# Patient Record
Sex: FEMALE | Race: WHITE | NOT HISPANIC OR LATINO | Employment: FULL TIME | URBAN - METROPOLITAN AREA
[De-identification: names, ages, dates, MRNs, and addresses within clinical notes are randomized per-mention and may not be internally consistent; named-entity substitution may affect disease eponyms.]

---

## 2019-09-10 ENCOUNTER — TELEPHONE (OUTPATIENT)
Dept: FAMILY MEDICINE | Facility: CLINIC | Age: 64
End: 2019-09-10

## 2019-09-11 NOTE — TELEPHONE ENCOUNTER
----- Message from Pao Davis sent at 9/10/2019  9:44 AM CDT -----  IMMUNIZATION, LOGAN ARCE, 9/7/19

## 2019-11-18 ENCOUNTER — OFFICE VISIT (OUTPATIENT)
Dept: FAMILY MEDICINE | Facility: CLINIC | Age: 64
End: 2019-11-18
Payer: COMMERCIAL

## 2019-11-18 DIAGNOSIS — K76.0 NAFLD (NONALCOHOLIC FATTY LIVER DISEASE): ICD-10-CM

## 2019-11-18 DIAGNOSIS — Z13.89 SCREENING FOR BLOOD OR PROTEIN IN URINE: ICD-10-CM

## 2019-11-18 DIAGNOSIS — Z00.00 ANNUAL PHYSICAL EXAM: Primary | ICD-10-CM

## 2019-11-18 DIAGNOSIS — Z13.6 SCREENING FOR ISCHEMIC HEART DISEASE (IHD): ICD-10-CM

## 2019-11-18 DIAGNOSIS — Z13.29 SCREENING FOR ENDOCRINE DISORDER: ICD-10-CM

## 2019-11-18 DIAGNOSIS — R74.8 ELEVATED LIVER ENZYMES: ICD-10-CM

## 2019-11-18 DIAGNOSIS — Z12.31 SCREENING MAMMOGRAM, ENCOUNTER FOR: ICD-10-CM

## 2019-11-18 PROCEDURE — 99396 PREV VISIT EST AGE 40-64: CPT | Mod: S$GLB,,, | Performed by: FAMILY MEDICINE

## 2019-11-18 PROCEDURE — 99396 PR PREVENTIVE VISIT,EST,40-64: ICD-10-PCS | Mod: S$GLB,,, | Performed by: FAMILY MEDICINE

## 2019-11-18 NOTE — PROGRESS NOTES
SUBJECTIVE:    Patient ID: Naye Gallegos is a 63 y.o. female.    Chief Complaint: Arthritis (check up)    Pt here for annual exam.    Has a hx macular degeneration. Doing good.  Seeing Dr. Watts.  Had a good cataract surgery.  No longer wearing contacts and reading glasses. Taking AREDS 2.    Walks for exercise, but works long hours.    No longer taking any extra vitamins and teas.  AREDS 2 has vitamin E.  Has a hx of liver enzymes.      Mammogram is due. Pap by Dr. ELEN Lopez      No visits with results within 6 Month(s) from this visit.   Latest known visit with results is:   No results found for any previous visit.       Past Medical History:   Diagnosis Date    Arthritis     Macular degeneration      Past Surgical History:   Procedure Laterality Date    bilateral cataract surgery      BREAST BIOPSY       SECTION       Family History   Problem Relation Age of Onset    Cancer Mother        Marital Status:   Alcohol History:  reports that she drinks about 2.0 - 3.0 standard drinks of alcohol per week.  Tobacco History:  reports that she has never smoked. She has never used smokeless tobacco.  Drug History:  reports that she does not use drugs.    Review of patient's allergies indicates:   Allergen Reactions    Cheese      Other reaction(s): Headache       Current Outpatient Medications:     vit C/E/Zn/coppr/lutein/zeaxan (OCUVITE LUTEIN AND ZEAXANTHIN ORAL), Take 2 capsules by mouth once daily., Disp: , Rfl:     Review of Systems   Constitutional: Negative for appetite change, fatigue, fever and unexpected weight change.   Respiratory: Negative for cough, chest tightness, shortness of breath and wheezing.    Cardiovascular: Negative for chest pain and leg swelling.   Gastrointestinal: Negative for abdominal pain, constipation, nausea and vomiting.        -heartburn   Genitourinary: Negative for difficulty urinating, dysuria, frequency and urgency.   Musculoskeletal: Negative for  "arthralgias, back pain, myalgias and neck pain.   Skin: Negative for rash.   Neurological: Negative for dizziness, weakness, numbness and headaches.   Hematological: Does not bruise/bleed easily.   Psychiatric/Behavioral: Negative for dysphoric mood, sleep disturbance and suicidal ideas. The patient is not nervous/anxious.    All other systems reviewed and are negative.         Objective:      Vitals:    11/18/19 1544   BP: (P) 138/88   Pulse: (P) 103   SpO2: (P) 97%   Weight: (P) 93.4 kg (205 lb 12.8 oz)   Height: (P) 5' 9" (1.753 m)     Body mass index is 30.39 kg/m² (pended).     Physical Exam   Constitutional: She is oriented to person, place, and time. She appears well-developed and well-nourished. No distress.   overweight   HENT:   Head: Normocephalic and atraumatic.   Neck: Neck supple. No thyromegaly present.   Cardiovascular: Normal rate, regular rhythm and normal heart sounds. Exam reveals no friction rub.   No murmur heard.  Pulmonary/Chest: Effort normal and breath sounds normal. She has no wheezes. She has no rales.   Abdominal: Soft. Bowel sounds are normal. She exhibits no distension. There is no tenderness.   Musculoskeletal: She exhibits no edema.   Lymphadenopathy:     She has no cervical adenopathy.   Neurological: She is alert and oriented to person, place, and time.   Skin: Skin is warm and dry. No rash noted.   Psychiatric: She has a normal mood and affect. Her speech is normal and behavior is normal. Judgment and thought content normal. She is attentive.   Vitals reviewed.        Assessment:       1. Annual physical exam    2. Elevated liver enzymes    3. NAFLD (nonalcoholic fatty liver disease)    4. Screening mammogram, encounter for    5. Screening for blood or protein in urine    6. Screening for ischemic heart disease (IHD)    7. Screening for endocrine disorder         Plan:       Annual physical exam  Comments:  To have pap, eye exam, and mammogram soon  Orders:  -     Lipid panel; " Future; Expected date: 11/18/2019  -     Comprehensive metabolic panel; Future; Expected date: 11/18/2019  -     Urinalysis; Future; Expected date: 11/18/2019  -     TSH w/reflex to FT4; Future; Expected date: 11/18/2019  -     CBC auto differential; Future; Expected date: 11/18/2019    Elevated liver enzymes  Comments:  Chronic. Has had GI workup. Will follow levels. Pt to avoid liver toxins.    NAFLD (nonalcoholic fatty liver disease)    Screening mammogram, encounter for  Comments:  Mammogram order given at visit today  Orders:  -     Mammo Digital Screening Bilat; Future; Expected date: 11/18/2019    Screening for blood or protein in urine  -     Urinalysis; Future; Expected date: 11/18/2019    Screening for ischemic heart disease (IHD)  -     Lipid panel; Future; Expected date: 11/18/2019  -     Comprehensive metabolic panel; Future; Expected date: 11/18/2019    Screening for endocrine disorder  -     TSH w/reflex to FT4; Future; Expected date: 11/18/2019    Labs have been ordered for monitoring of chronic conditions, just before next visit.  Pt will attempt to get Shingrix at local pharm    Follow up in about 1 year (around 11/18/2020), or elevated liver enzymes.

## 2019-11-22 LAB
ALBUMIN SERPL-MCNC: 4.3 G/DL (ref 3.6–5.1)
ALBUMIN/GLOB SERPL: 1.8 (CALC) (ref 1–2.5)
ALP SERPL-CCNC: 90 U/L (ref 33–130)
ALT SERPL-CCNC: 60 U/L (ref 6–29)
APPEARANCE UR: CLEAR
AST SERPL-CCNC: 39 U/L (ref 10–35)
BASOPHILS # BLD AUTO: 42 CELLS/UL (ref 0–200)
BASOPHILS NFR BLD AUTO: 1 %
BILIRUB SERPL-MCNC: 0.6 MG/DL (ref 0.2–1.2)
BILIRUB UR QL STRIP: NEGATIVE
BUN SERPL-MCNC: 16 MG/DL (ref 7–25)
BUN/CREAT SERPL: ABNORMAL (CALC) (ref 6–22)
CALCIUM SERPL-MCNC: 9.8 MG/DL (ref 8.6–10.4)
CHLORIDE SERPL-SCNC: 106 MMOL/L (ref 98–110)
CHOLEST SERPL-MCNC: 260 MG/DL
CHOLEST/HDLC SERPL: 4.3 (CALC)
CO2 SERPL-SCNC: 28 MMOL/L (ref 20–32)
COLOR UR: YELLOW
CREAT SERPL-MCNC: 0.74 MG/DL (ref 0.5–0.99)
EOSINOPHIL # BLD AUTO: 189 CELLS/UL (ref 15–500)
EOSINOPHIL NFR BLD AUTO: 4.5 %
ERYTHROCYTE [DISTWIDTH] IN BLOOD BY AUTOMATED COUNT: 12.2 % (ref 11–15)
GFRSERPLBLD MDRD-ARVRAT: 86 ML/MIN/1.73M2
GLOBULIN SER CALC-MCNC: 2.4 G/DL (CALC) (ref 1.9–3.7)
GLUCOSE SERPL-MCNC: 101 MG/DL (ref 65–99)
GLUCOSE UR QL STRIP: NEGATIVE
HCT VFR BLD AUTO: 44.1 % (ref 35–45)
HDLC SERPL-MCNC: 61 MG/DL
HGB BLD-MCNC: 15.1 G/DL (ref 11.7–15.5)
HGB UR QL STRIP: NEGATIVE
KETONES UR QL STRIP: NEGATIVE
LDLC SERPL CALC-MCNC: 180 MG/DL (CALC)
LEUKOCYTE ESTERASE UR QL STRIP: NEGATIVE
LYMPHOCYTES # BLD AUTO: 1340 CELLS/UL (ref 850–3900)
LYMPHOCYTES NFR BLD AUTO: 31.9 %
MCH RBC QN AUTO: 30.5 PG (ref 27–33)
MCHC RBC AUTO-ENTMCNC: 34.2 G/DL (ref 32–36)
MCV RBC AUTO: 89.1 FL (ref 80–100)
MONOCYTES # BLD AUTO: 298 CELLS/UL (ref 200–950)
MONOCYTES NFR BLD AUTO: 7.1 %
NEUTROPHILS # BLD AUTO: 2331 CELLS/UL (ref 1500–7800)
NEUTROPHILS NFR BLD AUTO: 55.5 %
NITRITE UR QL STRIP: NEGATIVE
NONHDLC SERPL-MCNC: 199 MG/DL (CALC)
PH UR STRIP: 6.5 [PH] (ref 5–8)
PLATELET # BLD AUTO: 242 THOUSAND/UL (ref 140–400)
PMV BLD REES-ECKER: 11.9 FL (ref 7.5–12.5)
POTASSIUM SERPL-SCNC: 4.4 MMOL/L (ref 3.5–5.3)
PROT SERPL-MCNC: 6.7 G/DL (ref 6.1–8.1)
PROT UR QL STRIP: NEGATIVE
RBC # BLD AUTO: 4.95 MILLION/UL (ref 3.8–5.1)
SODIUM SERPL-SCNC: 142 MMOL/L (ref 135–146)
SP GR UR STRIP: 1.02 (ref 1–1.03)
TRIGL SERPL-MCNC: 80 MG/DL
TSH SERPL-ACNC: 3.1 MIU/L (ref 0.4–4.5)
WBC # BLD AUTO: 4.2 THOUSAND/UL (ref 3.8–10.8)

## 2020-01-06 ENCOUNTER — HOSPITAL ENCOUNTER (OUTPATIENT)
Dept: RADIOLOGY | Facility: HOSPITAL | Age: 65
Discharge: HOME | End: 2020-01-06
Attending: FAMILY MEDICINE
Payer: COMMERCIAL

## 2020-01-06 DIAGNOSIS — Z12.31 SCREENING MAMMOGRAM, ENCOUNTER FOR: ICD-10-CM

## 2020-01-06 PROCEDURE — 77067 SCR MAMMO BI INCL CAD: CPT | Mod: TC,PO

## 2020-01-08 ENCOUNTER — PATIENT MESSAGE (OUTPATIENT)
Dept: FAMILY MEDICINE | Facility: CLINIC | Age: 65
End: 2020-01-08

## 2020-01-09 ENCOUNTER — PATIENT MESSAGE (OUTPATIENT)
Dept: FAMILY MEDICINE | Facility: CLINIC | Age: 65
End: 2020-01-09

## 2020-01-09 ENCOUNTER — TELEPHONE (OUTPATIENT)
Dept: FAMILY MEDICINE | Facility: CLINIC | Age: 65
End: 2020-01-09

## 2020-01-09 NOTE — PROGRESS NOTES
Mess to portal:    Hi Ms Gallegos,    Your mammogram is normal. To repeat in 1-2 years.    Thank you,    Dr Renteria/dhaval

## 2021-04-19 ENCOUNTER — PATIENT MESSAGE (OUTPATIENT)
Dept: FAMILY MEDICINE | Facility: CLINIC | Age: 66
End: 2021-04-19

## 2022-03-08 DIAGNOSIS — Z12.31 ENCOUNTER FOR MAMMOGRAM TO ESTABLISH BASELINE MAMMOGRAM: Primary | ICD-10-CM

## 2022-03-21 ENCOUNTER — OFFICE VISIT (OUTPATIENT)
Dept: FAMILY MEDICINE | Facility: CLINIC | Age: 67
End: 2022-03-21

## 2022-03-21 VITALS
HEART RATE: 84 BPM | SYSTOLIC BLOOD PRESSURE: 162 MMHG | TEMPERATURE: 98 F | OXYGEN SATURATION: 98 % | WEIGHT: 214 LBS | HEIGHT: 69 IN | DIASTOLIC BLOOD PRESSURE: 94 MMHG | BODY MASS INDEX: 31.7 KG/M2

## 2022-03-21 DIAGNOSIS — M81.0 OSTEOPOROSIS, UNSPECIFIED OSTEOPOROSIS TYPE, UNSPECIFIED PATHOLOGICAL FRACTURE PRESENCE: ICD-10-CM

## 2022-03-21 DIAGNOSIS — Z78.0 MENOPAUSE: ICD-10-CM

## 2022-03-21 DIAGNOSIS — I10 ESSENTIAL HYPERTENSION: ICD-10-CM

## 2022-03-21 DIAGNOSIS — K76.0 NAFLD (NONALCOHOLIC FATTY LIVER DISEASE): ICD-10-CM

## 2022-03-21 DIAGNOSIS — Z23 NEED FOR PNEUMOCOCCAL VACCINE: ICD-10-CM

## 2022-03-21 DIAGNOSIS — Z00.00 GENERAL MEDICAL EXAM: Primary | ICD-10-CM

## 2022-03-21 PROCEDURE — 90471 PNEUMOCOCCAL CONJUGATE VACCINE 13-VALENT LESS THAN 5YO & GREATER THAN: ICD-10-PCS | Mod: S$GLB,,, | Performed by: PHYSICIAN ASSISTANT

## 2022-03-21 PROCEDURE — 90670 PNEUMOCOCCAL CONJUGATE VACCINE 13-VALENT LESS THAN 5YO & GREATER THAN: ICD-10-PCS | Mod: S$GLB,,, | Performed by: PHYSICIAN ASSISTANT

## 2022-03-21 PROCEDURE — 99214 PR OFFICE/OUTPT VISIT, EST, LEVL IV, 30-39 MIN: ICD-10-PCS | Mod: 25,S$GLB,, | Performed by: PHYSICIAN ASSISTANT

## 2022-03-21 PROCEDURE — 99214 OFFICE O/P EST MOD 30 MIN: CPT | Mod: 25,S$GLB,, | Performed by: PHYSICIAN ASSISTANT

## 2022-03-21 PROCEDURE — 90471 IMMUNIZATION ADMIN: CPT | Mod: S$GLB,,, | Performed by: PHYSICIAN ASSISTANT

## 2022-03-21 PROCEDURE — 90670 PCV13 VACCINE IM: CPT | Mod: S$GLB,,, | Performed by: PHYSICIAN ASSISTANT

## 2022-03-21 RX ORDER — TELMISARTAN AND HYDROCHLORTHIAZIDE 40; 12.5 MG/1; MG/1
1 TABLET ORAL DAILY
Qty: 90 TABLET | Refills: 1 | Status: SHIPPED | OUTPATIENT
Start: 2022-03-21 | End: 2022-09-12

## 2022-03-21 NOTE — PROGRESS NOTES
SUBJECTIVE:    Patient ID: Naye Gallegos is a 66 y.o. female.    Chief Complaint: Annual Exam (No RX's, dexa scan ordered//dp)    Pt is a 66-year-old female who presents today for an annual visit.  Overall, she reports feeling well and is without complaints.  She does not follow any particular diet, and averages 3 meals/day.  She goes for walks 3x/week, averaging, 1.5 miles/walk.  Additionally, she enjoys gardening and yard work.  BP is noted to be elevated today in office at 162/94 mmHg.  She is currently not on any medication and does not routinely check her BP at home, so no baseline BP  provided today.  She denies experiencing any dizziness, frequent headaches, CP, palpitations, or syncopal episodes.    She was previously seeing Dr. Riojas for eye care, but due to his [facility], she will be transitioning her care to Dr. Bhardwaj (Ophthalmologist). He will be managing her history of macular degeneration.  She is without complaints today and denies any visual changes, photophobia, or blurred vision.      Dr. Lopez (GYN) - she continues to follow-up with him annually. Has an upcoming appointment scheduled with him in May 2022.  He manages her PAPs and mammograms.  She is scheduled to have her mammogram completed on Wednesday (03/23/2022).    Dr. Loredo (Dermatology) - follows up with her annually for routine skin checks.  Recently followed up with her earlier this month.    UDT: C-scope (7/21/2015) - Dr. Spence - RTC 10 years.    No visits with results within 6 Month(s) from this visit.   Latest known visit with results is:   Office Visit on 11/18/2019   Component Date Value Ref Range Status    Cholesterol 11/21/2019 260 (A) <200 mg/dL Final    HDL 11/21/2019 61  >50 mg/dL Final    Triglycerides 11/21/2019 80  <150 mg/dL Final    LDL Cholesterol 11/21/2019 180 (A) mg/dL (calc) Final    HDL/Cholesterol Ratio 11/21/2019 4.3  <5.0 (calc) Final    Non HDL Chol. (LDL+VLDL) 11/21/2019 199 (A) <130 mg/dL  (calc) Final    Glucose 11/21/2019 101 (A) 65 - 99 mg/dL Final    BUN 11/21/2019 16  7 - 25 mg/dL Final    Creatinine 11/21/2019 0.74  0.50 - 0.99 mg/dL Final    eGFR if non African American 11/21/2019 86  > OR = 60 mL/min/1.73m2 Final    eGFR if African American 11/21/2019 100  > OR = 60 mL/min/1.73m2 Final    BUN/Creatinine Ratio 11/21/2019 NOT APPLICABLE  6 - 22 (calc) Final    Sodium 11/21/2019 142  135 - 146 mmol/L Final    Potassium 11/21/2019 4.4  3.5 - 5.3 mmol/L Final    Chloride 11/21/2019 106  98 - 110 mmol/L Final    CO2 11/21/2019 28  20 - 32 mmol/L Final    Calcium 11/21/2019 9.8  8.6 - 10.4 mg/dL Final    Total Protein 11/21/2019 6.7  6.1 - 8.1 g/dL Final    Albumin 11/21/2019 4.3  3.6 - 5.1 g/dL Final    Globulin, Total 11/21/2019 2.4  1.9 - 3.7 g/dL (calc) Final    Albumin/Globulin Ratio 11/21/2019 1.8  1.0 - 2.5 (calc) Final    Total Bilirubin 11/21/2019 0.6  0.2 - 1.2 mg/dL Final    Alkaline Phosphatase 11/21/2019 90  33 - 130 U/L Final    AST 11/21/2019 39 (A) 10 - 35 U/L Final    ALT 11/21/2019 60 (A) 6 - 29 U/L Final    Color, UA 11/21/2019 YELLOW  YELLOW Final    Appearance, UA 11/21/2019 CLEAR  CLEAR Final    Specific [place], UA 11/21/2019 1.022  1.001 - 1.035 Final    pH, UA 11/21/2019 6.5  5.0 - 8.0 Final    Glucose, UA 11/21/2019 NEGATIVE  NEGATIVE Final    Bilirubin, UA 11/21/2019 NEGATIVE  NEGATIVE Final    Ketones, UA 11/21/2019 NEGATIVE  NEGATIVE Final    Occult Blood UA 11/21/2019 NEGATIVE  NEGATIVE Final    Protein, UA 11/21/2019 NEGATIVE  NEGATIVE Final    Nitrite, UA 11/21/2019 NEGATIVE  NEGATIVE Final    Leukocytes, UA 11/21/2019 NEGATIVE  NEGATIVE Final    TSH w/reflex to FT4 11/21/2019 3.10  0.40 - 4.50 mIU/L Final    WBC 11/21/2019 4.2  3.8 - 10.8 Thousand/uL Final    RBC 11/21/2019 4.95  3.80 - 5.10 Million/uL Final    Hemoglobin 11/21/2019 15.1  11.7 - 15.5 g/dL Final    Hematocrit 11/21/2019 44.1  35.0 - 45.0 % Final    MCV 11/21/2019  89.1  80.0 - 100.0 fL Final    MCH 2019 30.5  27.0 - 33.0 pg Final    MCHC 2019 34.2  32.0 - 36.0 g/dL Final    RDW 2019 12.2  11.0 - 15.0 % Final    Platelets 2019 242  140 - 400 Thousand/uL Final    MPV 2019 11.9  7.5 - 12.5 fL Final    Neutrophils, Abs 2019 2,331  1,500 - 7,800 cells/uL Final    Lymph # 2019 1,340  850 - 3,900 cells/uL Final    Mono # 2019 298  200 - 950 cells/uL Final    Eos # 2019 189  15 - 500 cells/uL Final    Baso # 2019 42  0 - 200 cells/uL Final    Neutrophils Relative 2019 55.5  % Final    Lymph % 2019 31.9  % Final    Mono % 2019 7.1  % Final    Eosinophil % 2019 4.5  % Final    Basophil % 2019 1.0  % Final       Past Medical History:   Diagnosis Date    Arthritis     Macular degeneration      Past Surgical History:   Procedure Laterality Date    bilateral cataract surgery      BREAST BIOPSY Bilateral     BREAST CYST ASPIRATION Bilateral      SECTION       Family History   Problem Relation Age of Onset    Cancer Mother     Breast cancer Mother     Breast cancer Maternal Grandmother        Marital Status:   Alcohol History:  reports current alcohol use of about 2.0 - 3.0 standard drinks of alcohol per week.  Tobacco History:  reports that she has never smoked. She has never used smokeless tobacco.  Drug History:  reports no history of drug use.    Health Maintenance Topics with due status: Not Due       Topic Last Completion Date    Colorectal Cancer Screening 2015    TETANUS VACCINE 10/22/2015    Lipid Panel 2019    Pneumococcal Vaccines (Age 65+) 2022     Immunization History   Administered Date(s) Administered    COVID-19, MRNA, LN-S, PF (MODERNA FULL 0.5 ML DOSE) 2021, 2021    COVID-19, MRNA, LN-S, PF (Pfizer) (Purple Cap) 2021    Influenza 10/19/2019    Influenza (FLUAD) - Quadrivalent - Adjuvanted - PF *Preferred*  "(65+) 11/09/2021    Influenza - Quadrivalent 09/07/2019    Influenza - Quadrivalent - PF *Preferred* (6 months and older) 10/22/2015, 12/03/2018, 09/06/2020    Pneumococcal Conjugate - 13 Valent 03/21/2022    Tdap 10/22/2015    Zoster 04/16/2015    Zoster Recombinant 09/06/2020, 11/22/2020       Review of patient's allergies indicates:   Allergen Reactions    Cheese      Other reaction(s): Headache       Current Outpatient Medications:     vit C/E/Zn/coppr/lutein/zeaxan (OCUVITE LUTEIN AND ZEAXANTHIN ORAL), Take 2 capsules by mouth once daily., Disp: , Rfl:     telmisartan-hydrochlorothiazide (MICARDIS HCT) 40-12.5 mg per tablet, Take 1 tablet by mouth once daily., Disp: 90 tablet, Rfl: 1    Review of Systems   Constitutional: Negative for activity change, chills, fatigue and fever.   HENT: Negative for congestion, ear discharge, ear pain, postnasal drip, rhinorrhea and sore throat.    Eyes: Negative for photophobia, pain and visual disturbance.   Respiratory: Negative for cough, shortness of breath and wheezing.    Cardiovascular: Negative for chest pain, palpitations and leg swelling.   Gastrointestinal: Negative for abdominal pain, constipation, diarrhea, nausea and vomiting.   Genitourinary: Negative for difficulty urinating, dysuria, frequency, hematuria and urgency.   Musculoskeletal: Negative for arthralgias and myalgias.   Neurological: Negative for dizziness, syncope, weakness, light-headedness and headaches.   Psychiatric/Behavioral: Negative for behavioral problems.          Objective:      Vitals:    03/21/22 0819 03/21/22 0852   BP: (!) 160/100 (!) 162/94   Pulse: 84    Temp: 98 °F (36.7 °C)    SpO2: 98%    Weight: 97.1 kg (214 lb)    Height: 5' 9" (1.753 m)      Physical Exam  Vitals and nursing note reviewed.   Constitutional:       General: She is not in acute distress.     Appearance: Normal appearance. She is obese. She is not ill-appearing, toxic-appearing or diaphoretic.   HENT:      " Head: Normocephalic and atraumatic.      Right Ear: Tympanic membrane, ear canal and external ear normal. There is no impacted cerumen.      Left Ear: Tympanic membrane, ear canal and external ear normal. There is no impacted cerumen.      Nose: Nose normal. No rhinorrhea.      Mouth/Throat:      Mouth: Mucous membranes are moist.      Pharynx: Oropharynx is clear. No oropharyngeal exudate or posterior oropharyngeal erythema.   Eyes:      General: No scleral icterus.        Right eye: No discharge.         Left eye: No discharge.      Extraocular Movements: Extraocular movements intact.      Conjunctiva/sclera: Conjunctivae normal.   Neck:      Vascular: No carotid bruit.   Cardiovascular:      Rate and Rhythm: Normal rate and regular rhythm.      Pulses: Normal pulses.      Heart sounds: Murmur (1/6 systolic aortic murmur noted.) heard.     No friction rub. No gallop.      Comments: Carotid pulsations noted on inspection.  Pulmonary:      Effort: Pulmonary effort is normal. No respiratory distress.      Breath sounds: Normal breath sounds. No wheezing, rhonchi or rales.   Abdominal:      General: There is no distension.      Palpations: Abdomen is soft.      Tenderness: There is no abdominal tenderness. There is no guarding or rebound.   Musculoskeletal:         General: No tenderness. Normal range of motion.      Cervical back: Normal range of motion and neck supple. No rigidity.      Right lower leg: No edema.      Left lower leg: No edema.      Comments: 90 degree flexion at the waist.  No pitting edema bilaterally.   Skin:     General: Skin is warm and dry.      Coloration: Skin is not jaundiced or pale.      Findings: No erythema or rash.   Neurological:      General: No focal deficit present.      Mental Status: She is alert. Mental status is at baseline.      Cranial Nerves: No cranial nerve deficit.      Gait: Gait normal.   Psychiatric:         Mood and Affect: Mood normal.         Behavior: Behavior  normal. Behavior is cooperative.           Assessment:       1. General medical exam    2. Essential hypertension    3. Need for pneumococcal vaccine    4. Osteoporosis, unspecified osteoporosis type, unspecified pathological fracture presence    5. Menopause    6. NAFLD (nonalcoholic fatty liver disease)           Plan:       General medical exam  Comments:  Lab work ordered today and to be completed when patient is fasting.    Essential hypertension  Comments:  BP elevated at 162/94 mmHg.  Will start Mycardis-HCT 40-12.5mg q.d.   Log BP b.i.d. and return for nurse visit/BP check in 2 weeks.  If patient tolerates Micardis-HCT, will repeat a BMP 4 weeks after nurse visit to assess kidney function and electrolyte status.  Orders:  -     telmisartan-hydrochlorothiazide (MICARDIS HCT) 40-12.5 mg per tablet; Take 1 tablet by mouth once daily.  Dispense: 90 tablet; Refill: 1  -     CBC Auto Differential; Future; Expected date: 03/21/2022  -     TSH w/reflex to FT4; Future; Expected date: 03/21/2022  -     Urinalysis, Reflex to Urine Culture Urine, Clean Catch; Future; Expected date: 03/21/2022    Need for pneumococcal vaccine  Comments:  Prevnar 13 administered today.  Administer Pneumovax 23 in 1 year.   Orders:  -     Pneumococcal Conjugate Vaccine (13 Valent) (IM)    Osteoporosis, unspecified osteoporosis type, unspecified pathological fracture presence  Comments:  DEXA scan ordered today and to be completed in the near future.  Orders:  -     DXA Bone Density Spine And Hip; Future; Expected date: 03/21/2022    Menopause  -     DXA Bone Density Spine And Hip; Future; Expected date: 03/21/2022    NAFLD (nonalcoholic fatty liver disease)  -     Comprehensive Metabolic Panel; Future; Expected date: 03/21/2022  -     Lipid Panel; Future; Expected date: 03/21/2022      Follow up in about 2 weeks (around 4/4/2022) for BP Check-Up w/Nurse, and follow up in 6 months for a regular visit.        3/21/2022 David Vazquez,  NIKOLAY

## 2022-03-22 LAB
ALBUMIN SERPL-MCNC: 4.7 G/DL (ref 3.6–5.1)
ALBUMIN/GLOB SERPL: 1.8 (CALC) (ref 1–2.5)
ALP SERPL-CCNC: 100 U/L (ref 37–153)
ALT SERPL-CCNC: 31 U/L (ref 6–29)
APPEARANCE UR: CLEAR
AST SERPL-CCNC: 21 U/L (ref 10–35)
BACTERIA #/AREA URNS HPF: ABNORMAL /HPF
BACTERIA UR CULT: ABNORMAL
BASOPHILS # BLD AUTO: 41 CELLS/UL (ref 0–200)
BASOPHILS NFR BLD AUTO: 0.9 %
BILIRUB SERPL-MCNC: 0.7 MG/DL (ref 0.2–1.2)
BILIRUB UR QL STRIP: NEGATIVE
BUN SERPL-MCNC: 17 MG/DL (ref 7–25)
BUN/CREAT SERPL: ABNORMAL (CALC) (ref 6–22)
CALCIUM SERPL-MCNC: 9.8 MG/DL (ref 8.6–10.4)
CHLORIDE SERPL-SCNC: 105 MMOL/L (ref 98–110)
CHOLEST SERPL-MCNC: 263 MG/DL
CHOLEST/HDLC SERPL: 3.7 (CALC)
CO2 SERPL-SCNC: 28 MMOL/L (ref 20–32)
COLOR UR: YELLOW
CREAT SERPL-MCNC: 0.76 MG/DL (ref 0.5–0.99)
EOSINOPHIL # BLD AUTO: 258 CELLS/UL (ref 15–500)
EOSINOPHIL NFR BLD AUTO: 5.6 %
ERYTHROCYTE [DISTWIDTH] IN BLOOD BY AUTOMATED COUNT: 12.1 % (ref 11–15)
GLOBULIN SER CALC-MCNC: 2.6 G/DL (CALC) (ref 1.9–3.7)
GLUCOSE SERPL-MCNC: 97 MG/DL (ref 65–99)
GLUCOSE UR QL STRIP: NEGATIVE
HCT VFR BLD AUTO: 47.8 % (ref 35–45)
HDLC SERPL-MCNC: 72 MG/DL
HGB BLD-MCNC: 15.9 G/DL (ref 11.7–15.5)
HGB UR QL STRIP: NEGATIVE
HYALINE CASTS #/AREA URNS LPF: ABNORMAL /LPF
KETONES UR QL STRIP: ABNORMAL
LDLC SERPL CALC-MCNC: 170 MG/DL (CALC)
LEUKOCYTE ESTERASE UR QL STRIP: NEGATIVE
LYMPHOCYTES # BLD AUTO: 1288 CELLS/UL (ref 850–3900)
LYMPHOCYTES NFR BLD AUTO: 28 %
MCH RBC QN AUTO: 30.4 PG (ref 27–33)
MCHC RBC AUTO-ENTMCNC: 33.3 G/DL (ref 32–36)
MCV RBC AUTO: 91.4 FL (ref 80–100)
MONOCYTES # BLD AUTO: 290 CELLS/UL (ref 200–950)
MONOCYTES NFR BLD AUTO: 6.3 %
NEUTROPHILS # BLD AUTO: 2723 CELLS/UL (ref 1500–7800)
NEUTROPHILS NFR BLD AUTO: 59.2 %
NITRITE UR QL STRIP: POSITIVE
NONHDLC SERPL-MCNC: 191 MG/DL (CALC)
PH UR STRIP: 5.5 [PH] (ref 5–8)
PLATELET # BLD AUTO: 249 THOUSAND/UL (ref 140–400)
PMV BLD REES-ECKER: 12.4 FL (ref 7.5–12.5)
POTASSIUM SERPL-SCNC: 3.9 MMOL/L (ref 3.5–5.3)
PROT SERPL-MCNC: 7.3 G/DL (ref 6.1–8.1)
PROT UR QL STRIP: NEGATIVE
RBC # BLD AUTO: 5.23 MILLION/UL (ref 3.8–5.1)
RBC #/AREA URNS HPF: ABNORMAL /HPF
SODIUM SERPL-SCNC: 142 MMOL/L (ref 135–146)
SP GR UR STRIP: 1.03 (ref 1–1.03)
SQUAMOUS #/AREA URNS HPF: ABNORMAL /HPF
TRIGL SERPL-MCNC: 94 MG/DL
TSH SERPL-ACNC: 2.48 MIU/L (ref 0.4–4.5)
WBC # BLD AUTO: 4.6 THOUSAND/UL (ref 3.8–10.8)
WBC #/AREA URNS HPF: ABNORMAL /HPF

## 2022-03-23 ENCOUNTER — HOSPITAL ENCOUNTER (OUTPATIENT)
Dept: RADIOLOGY | Facility: HOSPITAL | Age: 67
Discharge: HOME | End: 2022-03-23
Attending: SPECIALIST
Payer: OTHER GOVERNMENT

## 2022-03-23 ENCOUNTER — TELEPHONE (OUTPATIENT)
Dept: FAMILY MEDICINE | Facility: CLINIC | Age: 67
End: 2022-03-23
Payer: OTHER GOVERNMENT

## 2022-03-23 ENCOUNTER — PATIENT MESSAGE (OUTPATIENT)
Dept: FAMILY MEDICINE | Facility: CLINIC | Age: 67
End: 2022-03-23
Payer: OTHER GOVERNMENT

## 2022-03-23 ENCOUNTER — HOSPITAL ENCOUNTER (OUTPATIENT)
Dept: RADIOLOGY | Facility: HOSPITAL | Age: 67
Discharge: HOME | End: 2022-03-23
Attending: PHYSICIAN ASSISTANT
Payer: OTHER GOVERNMENT

## 2022-03-23 VITALS — WEIGHT: 214.06 LBS | HEIGHT: 69 IN | BODY MASS INDEX: 31.71 KG/M2

## 2022-03-23 DIAGNOSIS — I10 ESSENTIAL HYPERTENSION: Primary | ICD-10-CM

## 2022-03-23 DIAGNOSIS — E78.2 MIXED HYPERLIPIDEMIA: ICD-10-CM

## 2022-03-23 DIAGNOSIS — Z78.0 MENOPAUSE: ICD-10-CM

## 2022-03-23 DIAGNOSIS — M81.0 OSTEOPOROSIS, UNSPECIFIED OSTEOPOROSIS TYPE, UNSPECIFIED PATHOLOGICAL FRACTURE PRESENCE: ICD-10-CM

## 2022-03-23 DIAGNOSIS — Z12.31 ENCOUNTER FOR MAMMOGRAM TO ESTABLISH BASELINE MAMMOGRAM: ICD-10-CM

## 2022-03-23 DIAGNOSIS — R92.8 ABNORMAL MAMMOGRAM: Primary | ICD-10-CM

## 2022-03-23 PROCEDURE — 77067 SCR MAMMO BI INCL CAD: CPT | Mod: TC,PO

## 2022-03-23 PROCEDURE — 77080 DXA BONE DENSITY AXIAL: CPT | Mod: TC,PO

## 2022-03-23 RX ORDER — ATORVASTATIN CALCIUM 10 MG/1
10 TABLET, FILM COATED ORAL DAILY
Qty: 90 TABLET | Refills: 1 | Status: SHIPPED | OUTPATIENT
Start: 2022-03-23 | End: 2022-09-12

## 2022-03-23 NOTE — TELEPHONE ENCOUNTER
----- Message from SULEIMAN Castelan sent at 3/23/2022 12:26 PM CDT -----  Please contact patient and inform her that her lab work was reviewed.   Thyroid and kidney function is well within normal limits. Liver function is near normal limits, with one enzyme (ALT) being slightly elevated at 31, ideally we like this below 29. I recommend limiting your alcohol intake and Tylenol use.  Cholesterol levels are elevated. Tot cholesterol is 263 and LDL is 170. Due to this, I recommend starting a cholesterol lowering medication called Lipitor 10mg, once daily.  If patient is amenable to starting this medication, we will repeat a lipid panel and CMP in 3 months.  RBC, hemoglobin, and hematocrit are slightly elevated (Polycythemia). One way to decrease these levels is by donating blood or having phlebotomy performed. I want to repeat a CBC in 3 months to assess these levels.

## 2022-03-23 NOTE — TELEPHONE ENCOUNTER
I have sent the prescription for the Lipitor 10mg once daily to your pharmacy. We will repeat blood work in 3 months to assess the effectiveness of this medication and liver function. I strongly encouraged diet modification and ramping up routine exercise. I have complete confidence in you and this treatment plan. If you need anything in the mean time, feel free to contact me. Take care.

## 2022-03-24 NOTE — TELEPHONE ENCOUNTER
Pt responded back to David through a portal message regarding Cholesterol lowering medications. Pt states she will donate blood instead of Phlebotomy. Advised pt to call if she has any further questions

## 2022-03-24 NOTE — TELEPHONE ENCOUNTER
Pt responded back to David through a portal message regarding Cholesterol lowering medications.labs have already been ordered and remind me created.  Pt states she will donate blood instead of Phlebotomy. Advised pt to call if she has any further questions. Pt voiced understanding.

## 2022-03-25 ENCOUNTER — TELEPHONE (OUTPATIENT)
Dept: FAMILY MEDICINE | Facility: CLINIC | Age: 67
End: 2022-03-25
Payer: OTHER GOVERNMENT

## 2022-03-25 NOTE — TELEPHONE ENCOUNTER
----- Message from SULEIMAN Castelan sent at 3/25/2022  8:01 AM CDT -----  Please contact patient and inform her that her DEXA scan results were received and reviewed - normal bone density noted.

## 2022-03-30 ENCOUNTER — HOSPITAL ENCOUNTER (OUTPATIENT)
Dept: RADIOLOGY | Facility: HOSPITAL | Age: 67
Discharge: HOME | End: 2022-03-30
Attending: SPECIALIST
Payer: OTHER GOVERNMENT

## 2022-03-30 DIAGNOSIS — R92.8 ABNORMAL MAMMOGRAM: ICD-10-CM

## 2022-03-30 PROCEDURE — 76642 ULTRASOUND BREAST LIMITED: CPT | Mod: TC,50,PO

## 2022-03-30 PROCEDURE — 77065 DX MAMMO INCL CAD UNI: CPT | Mod: TC,PO,LT

## 2022-04-04 ENCOUNTER — CLINICAL SUPPORT (OUTPATIENT)
Dept: FAMILY MEDICINE | Facility: CLINIC | Age: 67
End: 2022-04-04
Payer: OTHER GOVERNMENT

## 2022-04-04 VITALS — SYSTOLIC BLOOD PRESSURE: 108 MMHG | DIASTOLIC BLOOD PRESSURE: 70 MMHG

## 2022-04-04 NOTE — PROGRESS NOTES
Pt here for BP check brought logs, and machine.     Per Dr. Cutler BP looks good continue as is and keep follow up appt.

## 2022-04-12 ENCOUNTER — HOSPITAL ENCOUNTER (OUTPATIENT)
Dept: RADIOLOGY | Facility: HOSPITAL | Age: 67
Discharge: HOME | End: 2022-04-12
Attending: SPECIALIST
Payer: OTHER GOVERNMENT

## 2022-04-12 DIAGNOSIS — R92.0 MAMMOGRAPHIC MICROCALCIFICATION: ICD-10-CM

## 2022-04-12 PROCEDURE — 25500020 PHARM REV CODE 255: Mod: PO | Performed by: SPECIALIST

## 2022-04-12 PROCEDURE — A9585 GADOBUTROL INJECTION: HCPCS | Mod: PO | Performed by: SPECIALIST

## 2022-04-12 PROCEDURE — 77049 MRI BREAST C-+ W/CAD BI: CPT | Mod: TC,PO

## 2022-04-12 RX ORDER — GADOBUTROL 604.72 MG/ML
9.5 INJECTION INTRAVENOUS
Status: COMPLETED | OUTPATIENT
Start: 2022-04-12 | End: 2022-04-12

## 2022-04-12 RX ADMIN — GADOBUTROL 9.5 ML: 604.72 INJECTION INTRAVENOUS at 02:04

## 2022-04-26 ENCOUNTER — HOSPITAL ENCOUNTER (OUTPATIENT)
Dept: RADIOLOGY | Facility: HOSPITAL | Age: 67
Discharge: HOME | End: 2022-04-26
Attending: SURGERY
Payer: OTHER GOVERNMENT

## 2022-04-26 DIAGNOSIS — R92.8 ABNORMAL MAMMOGRAM OF LEFT BREAST: ICD-10-CM

## 2022-04-26 PROCEDURE — 88342 CHG IMMUNOCYTOCHEMISTRY: ICD-10-PCS | Mod: 26,,, | Performed by: STUDENT IN AN ORGANIZED HEALTH CARE EDUCATION/TRAINING PROGRAM

## 2022-04-26 PROCEDURE — A4648 IMPLANTABLE TISSUE MARKER: HCPCS

## 2022-04-26 PROCEDURE — 88342 IMHCHEM/IMCYTCHM 1ST ANTB: CPT | Mod: 26,,, | Performed by: STUDENT IN AN ORGANIZED HEALTH CARE EDUCATION/TRAINING PROGRAM

## 2022-04-26 PROCEDURE — 19081 MAMMO BREAST STEREOTACTIC BREAST BIOPSY LEFT: ICD-10-PCS | Mod: LT,,, | Performed by: RADIOLOGY

## 2022-04-26 PROCEDURE — 88341 IMHCHEM/IMCYTCHM EA ADD ANTB: CPT | Mod: 26,,, | Performed by: STUDENT IN AN ORGANIZED HEALTH CARE EDUCATION/TRAINING PROGRAM

## 2022-04-26 PROCEDURE — 88341 IMHCHEM/IMCYTCHM EA ADD ANTB: CPT | Mod: 59 | Performed by: STUDENT IN AN ORGANIZED HEALTH CARE EDUCATION/TRAINING PROGRAM

## 2022-04-26 PROCEDURE — 76098 X-RAY EXAM SURGICAL SPECIMEN: CPT | Mod: 26,59,, | Performed by: RADIOLOGY

## 2022-04-26 PROCEDURE — 88305 TISSUE EXAM BY PATHOLOGIST: CPT | Performed by: STUDENT IN AN ORGANIZED HEALTH CARE EDUCATION/TRAINING PROGRAM

## 2022-04-26 PROCEDURE — 88342 IMHCHEM/IMCYTCHM 1ST ANTB: CPT | Performed by: STUDENT IN AN ORGANIZED HEALTH CARE EDUCATION/TRAINING PROGRAM

## 2022-04-26 PROCEDURE — 88305 TISSUE EXAM BY PATHOLOGIST: ICD-10-PCS | Mod: 26,,, | Performed by: STUDENT IN AN ORGANIZED HEALTH CARE EDUCATION/TRAINING PROGRAM

## 2022-04-26 PROCEDURE — 25000003 PHARM REV CODE 250: Performed by: SURGERY

## 2022-04-26 PROCEDURE — 76098 X-RAY EXAM SURGICAL SPECIMEN: CPT | Mod: TC

## 2022-04-26 PROCEDURE — 88341 PR IHC OR ICC EACH ADD'L SINGLE ANTIBODY  STAINPR: ICD-10-PCS | Mod: 26,,, | Performed by: STUDENT IN AN ORGANIZED HEALTH CARE EDUCATION/TRAINING PROGRAM

## 2022-04-26 PROCEDURE — 19081 BX BREAST 1ST LESION STRTCTC: CPT | Mod: LT,,, | Performed by: RADIOLOGY

## 2022-04-26 PROCEDURE — 88305 TISSUE EXAM BY PATHOLOGIST: CPT | Mod: 26,,, | Performed by: STUDENT IN AN ORGANIZED HEALTH CARE EDUCATION/TRAINING PROGRAM

## 2022-04-26 PROCEDURE — 76098 MAMMO BREAST SPECIMEN: ICD-10-PCS | Mod: 26,59,, | Performed by: RADIOLOGY

## 2022-04-26 RX ADMIN — LIDOCAINE HYDROCHLORIDE 30 ML: 10; .005 INJECTION, SOLUTION EPIDURAL; INFILTRATION; INTRACAUDAL; PERINEURAL at 02:04

## 2022-04-27 ENCOUNTER — TELEPHONE (OUTPATIENT)
Dept: RADIOLOGY | Facility: HOSPITAL | Age: 67
End: 2022-04-27
Payer: OTHER GOVERNMENT

## 2022-04-27 NOTE — TELEPHONE ENCOUNTER
Called patient to follow up after breast biopsy done 4/26/2022. Patient voices no s/s of infection or concerns. Patient notified once results back from pathology this nurse will call to notify her.

## 2022-05-03 LAB
FINAL PATHOLOGIC DIAGNOSIS: NORMAL
GROSS: NORMAL
Lab: NORMAL

## 2022-05-19 ENCOUNTER — HOSPITAL ENCOUNTER (OUTPATIENT)
Dept: RADIOLOGY | Facility: HOSPITAL | Age: 67
Discharge: HOME | End: 2022-05-19
Attending: SURGERY
Payer: OTHER GOVERNMENT

## 2022-05-19 ENCOUNTER — HOSPITAL ENCOUNTER (OUTPATIENT)
Dept: PREADMISSION TESTING | Facility: HOSPITAL | Age: 67
Discharge: HOME | End: 2022-05-19
Attending: SURGERY
Payer: OTHER GOVERNMENT

## 2022-05-19 VITALS
OXYGEN SATURATION: 96 % | RESPIRATION RATE: 18 BRPM | HEIGHT: 69 IN | BODY MASS INDEX: 30.21 KG/M2 | WEIGHT: 204 LBS | SYSTOLIC BLOOD PRESSURE: 158 MMHG | DIASTOLIC BLOOD PRESSURE: 80 MMHG | TEMPERATURE: 99 F | HEART RATE: 106 BPM

## 2022-05-19 DIAGNOSIS — Z01.818 PRE-OP TESTING: Primary | ICD-10-CM

## 2022-05-19 DIAGNOSIS — Z01.818 PRE-OP TESTING: ICD-10-CM

## 2022-05-19 DIAGNOSIS — Z41.9 SURGERY, ELECTIVE: ICD-10-CM

## 2022-05-19 PROCEDURE — 93005 ELECTROCARDIOGRAM TRACING: CPT | Performed by: SPECIALIST

## 2022-05-19 PROCEDURE — 93010 EKG 12-LEAD: ICD-10-PCS | Mod: ,,, | Performed by: SPECIALIST

## 2022-05-19 PROCEDURE — 93010 ELECTROCARDIOGRAM REPORT: CPT | Mod: ,,, | Performed by: SPECIALIST

## 2022-05-19 PROCEDURE — 71046 X-RAY EXAM CHEST 2 VIEWS: CPT | Mod: TC

## 2022-05-19 RX ORDER — CEFAZOLIN SODIUM 2 G/50ML
2 SOLUTION INTRAVENOUS ONCE
Status: CANCELLED | OUTPATIENT
Start: 2022-05-24

## 2022-05-19 NOTE — PRE-PROCEDURE INSTRUCTIONS
Pre op instructions after reviewing history and medications; npo after midnight; advised not to take Micardis am of OR; questions answered; verbalized understanding.

## 2022-05-19 NOTE — CARE UPDATE
Spoke to Alivia in lab when I noticed no urine result.  States it was not collected.  Jumana from Dr Calderon's office then called to ask if ua was done.  States patient was inquiring on the other line.  She will return to lab tomorrow for ua.   Jumana also made aware of no H & P - will send tomorrow

## 2022-05-19 NOTE — DISCHARGE INSTRUCTIONS
To confirm, Your doctor has instructed you that surgery is scheduled for: May 24    Pre-Op will call the afternoon prior to surgery between 4:00 and 6:00 PM with the final arrival time.     1 Person can come with you the day of surgery.  Please park in the Garage Parking and come through front entrance.  Do not park in [facility] parking lot as you will have to walk to registration.     GO TO REGISTRATION     After registration, proceed past gift shop and through glass door ( [facility]) Check in at the nurses station to the left.   Do not eat or drink anything after midnight the night before your surgery - THIS INCLUDES  WATER, GUM, MINTS AND CANDY.  YOU MAY BRUSH YOUR TEETH BUT DO NOT SWALLOW     TAKE ONLY THESE MEDICATIONS WITH A SMALL SIP OF WATER THE MORNING OF YOUR PROCEDURE: NONE    Do not take Micardis am of surgery       PLEASE NOTE:  The surgery schedule has many variables which may affect the time of your surgery case.  Family members should be available if your surgery time changes.  Plan to be here the day of your procedure between 4-6 hours.      DO NOT TAKE THESE MEDICATIONS 5-7 DAYS PRIOR to your procedure or per your surgeon's request: ASPIRIN, ALEVE, ADVIL, IBUPROFEN,  NAYANA SELTZER, BC , FISH OIL , VITAMIN E, HERBALS  (May take Tylenol)                                                          IMPORTANT INSTRUCTIONS      Do not smoke, vape or drink alcoholic beverages 24 hours prior to your procedure.  Shower the night before AND the morning of your procedure with a Chlorhexidine wash such as Hibiclens or Dial antibacterial soap from the neck down.   No lotions, powder or oils on your skin after you shower.   Do not get it on your face or in your eyes.  You may use your own shampoo and face wash. This helps your skin to be as bacteria free as possible.    DO NOT remove hair from the surgery site.  Do not shave the incision site unless you are given specific instructions to do so.     Sleep in a bed with clean sheets.    Do not sleep with a pet in the bed.   If you wear contact lenses, dentures, hearing aids or glasses, bring a container to put them in during surgery and give to a family member for safe keeping.    Please leave all jewelry, piercing's and valuables at home.   Wear rubber soled shoes (no flip flops).  If your doctor has scheduled you for an overnight stay, bring a small overnight bag with any personal items you need.    Make arrangements in advance for transportation home by a responsible adult.      You must make arrangements for transportation, TAXI'S, UBER'S OR LYFTS ARE NOT ALLOWED.        If you have any questions about these instructions, call (Monday - Friday) Pre-Op Admit  Nursing  at [phone] or the Pre-Op Day Surgery Unit at [phone].

## 2022-05-20 ENCOUNTER — LAB VISIT (OUTPATIENT)
Dept: LAB | Facility: HOSPITAL | Age: 67
End: 2022-05-20
Attending: SURGERY
Payer: OTHER GOVERNMENT

## 2022-05-20 DIAGNOSIS — Z01.818 PRE-OP TESTING: ICD-10-CM

## 2022-05-20 LAB
BILIRUB UR QL STRIP: NEGATIVE
CLARITY UR: CLEAR
COLOR UR: YELLOW
GLUCOSE UR QL STRIP: NEGATIVE
HGB UR QL STRIP: NEGATIVE
KETONES UR QL STRIP: NEGATIVE
LEUKOCYTE ESTERASE UR QL STRIP: NEGATIVE
NITRITE UR QL STRIP: NEGATIVE
PH UR STRIP: 6 [PH] (ref 5–8)
PROT UR QL STRIP: NEGATIVE
SP GR UR STRIP: 1.01 (ref 1–1.03)
URN SPEC COLLECT METH UR: NORMAL
UROBILINOGEN UR STRIP-ACNC: NEGATIVE EU/DL

## 2022-05-20 PROCEDURE — 81003 URINALYSIS AUTO W/O SCOPE: CPT | Performed by: SURGERY

## 2022-05-23 ENCOUNTER — HOSPITAL ENCOUNTER (OUTPATIENT)
Dept: RADIOLOGY | Facility: HOSPITAL | Age: 67
Discharge: HOME | End: 2022-05-23
Attending: SURGERY
Payer: OTHER GOVERNMENT

## 2022-05-23 DIAGNOSIS — R92.0 ABNORMAL FINDING ON MAMMOGRAPHY, MICROCALCIFICATION: ICD-10-CM

## 2022-05-23 PROCEDURE — 19283 PERQ DEV BREAST 1ST STRTCTC: CPT | Mod: PO,LT

## 2022-05-24 ENCOUNTER — ANESTHESIA (OUTPATIENT)
Dept: SURGERY | Facility: HOSPITAL | Age: 67
End: 2022-05-24
Payer: OTHER GOVERNMENT

## 2022-05-24 ENCOUNTER — ANESTHESIA EVENT (OUTPATIENT)
Dept: SURGERY | Facility: HOSPITAL | Age: 67
End: 2022-05-24
Payer: OTHER GOVERNMENT

## 2022-05-24 ENCOUNTER — HOSPITAL ENCOUNTER (OUTPATIENT)
Facility: HOSPITAL | Age: 67
Discharge: HOME | End: 2022-05-24
Attending: SURGERY | Admitting: SURGERY
Payer: OTHER GOVERNMENT

## 2022-05-24 VITALS
RESPIRATION RATE: 16 BRPM | DIASTOLIC BLOOD PRESSURE: 74 MMHG | TEMPERATURE: 98 F | OXYGEN SATURATION: 96 % | HEIGHT: 69 IN | BODY MASS INDEX: 30.21 KG/M2 | SYSTOLIC BLOOD PRESSURE: 159 MMHG | HEART RATE: 90 BPM | WEIGHT: 203.94 LBS

## 2022-05-24 DIAGNOSIS — Z41.9 SURGERY, ELECTIVE: ICD-10-CM

## 2022-05-24 DIAGNOSIS — D05.12 DUCTAL CARCINOMA IN SITU (DCIS) OF LEFT BREAST: Primary | ICD-10-CM

## 2022-05-24 PROCEDURE — A4648 IMPLANTABLE TISSUE MARKER: HCPCS | Performed by: SURGERY

## 2022-05-24 PROCEDURE — 27202177 HC INTRODUCER, TRACHEAL TUBE: Performed by: ANESTHESIOLOGY

## 2022-05-24 PROCEDURE — 71000015 HC POSTOP RECOV 1ST HR: Performed by: SURGERY

## 2022-05-24 PROCEDURE — 27000673 HC TUBING BLOOD Y: Performed by: ANESTHESIOLOGY

## 2022-05-24 PROCEDURE — 27000671 HC TUBING MICROBORE EXT: Performed by: ANESTHESIOLOGY

## 2022-05-24 PROCEDURE — 37000009 HC ANESTHESIA EA ADD 15 MINS: Performed by: SURGERY

## 2022-05-24 PROCEDURE — 71000016 HC POSTOP RECOV ADDL HR: Performed by: SURGERY

## 2022-05-24 PROCEDURE — 71000033 HC RECOVERY, INTIAL HOUR: Performed by: SURGERY

## 2022-05-24 PROCEDURE — 37000008 HC ANESTHESIA 1ST 15 MINUTES: Performed by: SURGERY

## 2022-05-24 PROCEDURE — 71000039 HC RECOVERY, EACH ADD'L HOUR: Performed by: SURGERY

## 2022-05-24 PROCEDURE — 63600175 PHARM REV CODE 636 W HCPCS: Performed by: SURGERY

## 2022-05-24 PROCEDURE — 27201423 OPTIME MED/SURG SUP & DEVICES STERILE SUPPLY: Performed by: SURGERY

## 2022-05-24 PROCEDURE — 36000706: Performed by: SURGERY

## 2022-05-24 PROCEDURE — 25000003 PHARM REV CODE 250: Performed by: ANESTHESIOLOGY

## 2022-05-24 PROCEDURE — 25000003 PHARM REV CODE 250: Performed by: NURSE ANESTHETIST, CERTIFIED REGISTERED

## 2022-05-24 PROCEDURE — 27202107 HC XP QUATRO SENSOR: Performed by: ANESTHESIOLOGY

## 2022-05-24 PROCEDURE — 63600175 PHARM REV CODE 636 W HCPCS: Performed by: NURSE ANESTHETIST, CERTIFIED REGISTERED

## 2022-05-24 PROCEDURE — 25000003 PHARM REV CODE 250: Performed by: SURGERY

## 2022-05-24 PROCEDURE — C9290 INJ, BUPIVACAINE LIPOSOME: HCPCS | Performed by: SURGERY

## 2022-05-24 PROCEDURE — 36000707: Performed by: SURGERY

## 2022-05-24 DEVICE — IMPLANTABLE DEVICE: Type: IMPLANTABLE DEVICE | Site: BREAST | Status: FUNCTIONAL

## 2022-05-24 RX ORDER — CEFAZOLIN SODIUM 2 G/50ML
2 SOLUTION INTRAVENOUS ONCE
Status: COMPLETED | OUTPATIENT
Start: 2022-05-24 | End: 2022-05-24

## 2022-05-24 RX ORDER — OXYCODONE HYDROCHLORIDE 5 MG/1
5 TABLET ORAL EVERY 4 HOURS PRN
Status: DISCONTINUED | OUTPATIENT
Start: 2022-05-24 | End: 2022-05-24 | Stop reason: HOSPADM

## 2022-05-24 RX ORDER — ONDANSETRON 4 MG/1
4 TABLET, ORALLY DISINTEGRATING ORAL ONCE
Status: DISCONTINUED | OUTPATIENT
Start: 2022-05-24 | End: 2022-05-24 | Stop reason: HOSPADM

## 2022-05-24 RX ORDER — HYDROCODONE BITARTRATE AND ACETAMINOPHEN 5; 325 MG/1; MG/1
1 TABLET ORAL EVERY 8 HOURS PRN
Qty: 18 TABLET | Refills: 0 | Status: SHIPPED | OUTPATIENT
Start: 2022-05-24 | End: 2022-09-30

## 2022-05-24 RX ORDER — ONDANSETRON 2 MG/ML
4 INJECTION INTRAMUSCULAR; INTRAVENOUS DAILY PRN
Status: DISCONTINUED | OUTPATIENT
Start: 2022-05-24 | End: 2022-05-24 | Stop reason: HOSPADM

## 2022-05-24 RX ORDER — DIPHENHYDRAMINE HYDROCHLORIDE 50 MG/ML
INJECTION INTRAMUSCULAR; INTRAVENOUS
Status: DISCONTINUED | OUTPATIENT
Start: 2022-05-24 | End: 2022-05-24

## 2022-05-24 RX ORDER — PROPOFOL 10 MG/ML
VIAL (ML) INTRAVENOUS
Status: DISCONTINUED | OUTPATIENT
Start: 2022-05-24 | End: 2022-05-24

## 2022-05-24 RX ORDER — LIDOCAINE HYDROCHLORIDE 40 MG/ML
SOLUTION TOPICAL
Status: DISCONTINUED | OUTPATIENT
Start: 2022-05-24 | End: 2022-05-24

## 2022-05-24 RX ORDER — MIDAZOLAM HYDROCHLORIDE 1 MG/ML
INJECTION INTRAMUSCULAR; INTRAVENOUS
Status: DISCONTINUED | OUTPATIENT
Start: 2022-05-24 | End: 2022-05-24

## 2022-05-24 RX ORDER — SODIUM CHLORIDE 0.9 G/100ML
IRRIGANT IRRIGATION
Status: DISCONTINUED | OUTPATIENT
Start: 2022-05-24 | End: 2022-05-24 | Stop reason: HOSPADM

## 2022-05-24 RX ORDER — HYDROCODONE BITARTRATE AND ACETAMINOPHEN 5; 325 MG/1; MG/1
1 TABLET ORAL EVERY 6 HOURS PRN
Status: CANCELLED | OUTPATIENT
Start: 2022-05-24

## 2022-05-24 RX ORDER — OXYCODONE HYDROCHLORIDE 5 MG/1
5 TABLET ORAL EVERY 4 HOURS PRN
Status: DISCONTINUED | OUTPATIENT
Start: 2022-05-24 | End: 2022-05-24 | Stop reason: SDUPTHER

## 2022-05-24 RX ORDER — DEXAMETHASONE SODIUM PHOSPHATE 4 MG/ML
INJECTION, SOLUTION INTRA-ARTICULAR; INTRALESIONAL; INTRAMUSCULAR; INTRAVENOUS; SOFT TISSUE
Status: DISCONTINUED | OUTPATIENT
Start: 2022-05-24 | End: 2022-05-24

## 2022-05-24 RX ORDER — ROCURONIUM BROMIDE 10 MG/ML
INJECTION, SOLUTION INTRAVENOUS
Status: DISCONTINUED | OUTPATIENT
Start: 2022-05-24 | End: 2022-05-24

## 2022-05-24 RX ORDER — PHENYLEPHRINE HYDROCHLORIDE 10 MG/ML
INJECTION INTRAVENOUS
Status: DISCONTINUED | OUTPATIENT
Start: 2022-05-24 | End: 2022-05-24

## 2022-05-24 RX ORDER — FENTANYL CITRATE 50 UG/ML
INJECTION, SOLUTION INTRAMUSCULAR; INTRAVENOUS
Status: DISCONTINUED | OUTPATIENT
Start: 2022-05-24 | End: 2022-05-24

## 2022-05-24 RX ORDER — LIDOCAINE HYDROCHLORIDE 20 MG/ML
INJECTION, SOLUTION EPIDURAL; INFILTRATION; INTRACAUDAL; PERINEURAL
Status: DISCONTINUED | OUTPATIENT
Start: 2022-05-24 | End: 2022-05-24

## 2022-05-24 RX ORDER — OXYCODONE HYDROCHLORIDE 5 MG/1
5 TABLET ORAL
Status: DISCONTINUED | OUTPATIENT
Start: 2022-05-24 | End: 2022-05-24 | Stop reason: HOSPADM

## 2022-05-24 RX ORDER — BUPIVACAINE HYDROCHLORIDE AND EPINEPHRINE 5; 5 MG/ML; UG/ML
INJECTION, SOLUTION EPIDURAL; INTRACAUDAL; PERINEURAL
Status: DISCONTINUED | OUTPATIENT
Start: 2022-05-24 | End: 2022-05-24 | Stop reason: HOSPADM

## 2022-05-24 RX ORDER — SODIUM CHLORIDE 0.9 % (FLUSH) 0.9 %
10 SYRINGE (ML) INJECTION
Status: DISCONTINUED | OUTPATIENT
Start: 2022-05-24 | End: 2022-05-24 | Stop reason: HOSPADM

## 2022-05-24 RX ORDER — SCOLOPAMINE TRANSDERMAL SYSTEM 1 MG/1
1 PATCH, EXTENDED RELEASE TRANSDERMAL ONCE
Status: DISCONTINUED | OUTPATIENT
Start: 2022-05-24 | End: 2022-05-24 | Stop reason: HOSPADM

## 2022-05-24 RX ORDER — ONDANSETRON 2 MG/ML
4 INJECTION INTRAMUSCULAR; INTRAVENOUS EVERY 12 HOURS PRN
Status: CANCELLED | OUTPATIENT
Start: 2022-05-24

## 2022-05-24 RX ORDER — FAMOTIDINE 10 MG/ML
INJECTION INTRAVENOUS
Status: DISCONTINUED | OUTPATIENT
Start: 2022-05-24 | End: 2022-05-24

## 2022-05-24 RX ORDER — DIPHENHYDRAMINE HYDROCHLORIDE 50 MG/ML
12.5 INJECTION INTRAMUSCULAR; INTRAVENOUS EVERY 4 HOURS
Status: DISCONTINUED | OUTPATIENT
Start: 2022-05-24 | End: 2022-05-24 | Stop reason: HOSPADM

## 2022-05-24 RX ORDER — HYDROMORPHONE HYDROCHLORIDE 1 MG/ML
0.2 INJECTION, SOLUTION INTRAMUSCULAR; INTRAVENOUS; SUBCUTANEOUS EVERY 5 MIN PRN
Status: DISCONTINUED | OUTPATIENT
Start: 2022-05-24 | End: 2022-05-24 | Stop reason: HOSPADM

## 2022-05-24 RX ORDER — ACETAMINOPHEN 10 MG/ML
1000 INJECTION, SOLUTION INTRAVENOUS ONCE
Status: CANCELLED | OUTPATIENT
Start: 2022-05-24 | End: 2022-05-24

## 2022-05-24 RX ORDER — ACETAMINOPHEN 325 MG/1
650 TABLET ORAL EVERY 4 HOURS PRN
Status: CANCELLED | OUTPATIENT
Start: 2022-05-24

## 2022-05-24 RX ORDER — ONDANSETRON 2 MG/ML
INJECTION INTRAMUSCULAR; INTRAVENOUS
Status: DISCONTINUED | OUTPATIENT
Start: 2022-05-24 | End: 2022-05-24

## 2022-05-24 RX ORDER — SUCCINYLCHOLINE CHLORIDE 20 MG/ML
INJECTION INTRAMUSCULAR; INTRAVENOUS
Status: DISCONTINUED | OUTPATIENT
Start: 2022-05-24 | End: 2022-05-24

## 2022-05-24 RX ORDER — SODIUM CHLORIDE, SODIUM LACTATE, POTASSIUM CHLORIDE, CALCIUM CHLORIDE 600; 310; 30; 20 MG/100ML; MG/100ML; MG/100ML; MG/100ML
INJECTION, SOLUTION INTRAVENOUS CONTINUOUS PRN
Status: DISCONTINUED | OUTPATIENT
Start: 2022-05-24 | End: 2022-05-24

## 2022-05-24 RX ORDER — LIDOCAINE HYDROCHLORIDE 20 MG/ML
JELLY TOPICAL
Status: DISCONTINUED | OUTPATIENT
Start: 2022-05-24 | End: 2022-05-24

## 2022-05-24 RX ORDER — DIPHENHYDRAMINE HYDROCHLORIDE 50 MG/ML
12.5 INJECTION INTRAMUSCULAR; INTRAVENOUS
Status: DISCONTINUED | OUTPATIENT
Start: 2022-05-24 | End: 2022-05-24 | Stop reason: HOSPADM

## 2022-05-24 RX ORDER — ACETAMINOPHEN 10 MG/ML
INJECTION, SOLUTION INTRAVENOUS
Status: DISCONTINUED | OUTPATIENT
Start: 2022-05-24 | End: 2022-05-24

## 2022-05-24 RX ADMIN — DIPHENHYDRAMINE HYDROCHLORIDE 6.25 MG: 50 INJECTION, SOLUTION INTRAMUSCULAR; INTRAVENOUS at 09:05

## 2022-05-24 RX ADMIN — ROCURONIUM BROMIDE 5 MG: 10 INJECTION, SOLUTION INTRAVENOUS at 09:05

## 2022-05-24 RX ADMIN — ROCURONIUM BROMIDE 25 MG: 10 INJECTION, SOLUTION INTRAVENOUS at 09:05

## 2022-05-24 RX ADMIN — SCOPOLAMINE 1 PATCH: 1 PATCH, EXTENDED RELEASE TRANSDERMAL at 09:05

## 2022-05-24 RX ADMIN — FAMOTIDINE 20 MG: 10 INJECTION, SOLUTION INTRAVENOUS at 09:05

## 2022-05-24 RX ADMIN — PHENYLEPHRINE HYDROCHLORIDE 200 MCG: 10 INJECTION INTRAVENOUS at 09:05

## 2022-05-24 RX ADMIN — LIDOCAINE HYDROCHLORIDE 4 ML: 40 SOLUTION TOPICAL at 09:05

## 2022-05-24 RX ADMIN — SUCCINYLCHOLINE CHLORIDE 120 MG: 20 INJECTION, SOLUTION INTRAMUSCULAR; INTRAVENOUS at 09:05

## 2022-05-24 RX ADMIN — SODIUM CHLORIDE, SODIUM LACTATE, POTASSIUM CHLORIDE, AND CALCIUM CHLORIDE: .6; .31; .03; .02 INJECTION, SOLUTION INTRAVENOUS at 10:05

## 2022-05-24 RX ADMIN — OXYCODONE HYDROCHLORIDE 5 MG: 5 TABLET ORAL at 11:05

## 2022-05-24 RX ADMIN — LIDOCAINE HYDROCHLORIDE 5 ML: 20 JELLY TOPICAL at 09:05

## 2022-05-24 RX ADMIN — SODIUM CHLORIDE, SODIUM LACTATE, POTASSIUM CHLORIDE, AND CALCIUM CHLORIDE: .6; .31; .03; .02 INJECTION, SOLUTION INTRAVENOUS at 09:05

## 2022-05-24 RX ADMIN — FENTANYL CITRATE 75 MCG: 50 INJECTION INTRAMUSCULAR; INTRAVENOUS at 09:05

## 2022-05-24 RX ADMIN — SODIUM CHLORIDE, SODIUM LACTATE, POTASSIUM CHLORIDE, AND CALCIUM CHLORIDE: .6; .31; .03; .02 INJECTION, SOLUTION INTRAVENOUS at 11:05

## 2022-05-24 RX ADMIN — DEXAMETHASONE SODIUM PHOSPHATE 8 MG: 4 INJECTION, SOLUTION INTRAMUSCULAR; INTRAVENOUS at 09:05

## 2022-05-24 RX ADMIN — FENTANYL CITRATE 25 MCG: 50 INJECTION INTRAMUSCULAR; INTRAVENOUS at 09:05

## 2022-05-24 RX ADMIN — CEFAZOLIN SODIUM 2 G: 2 SOLUTION INTRAVENOUS at 09:05

## 2022-05-24 RX ADMIN — ACETAMINOPHEN 1000 MG: 10 INJECTION, SOLUTION INTRAVENOUS at 09:05

## 2022-05-24 RX ADMIN — ONDANSETRON 4 MG: 2 INJECTION INTRAMUSCULAR; INTRAVENOUS at 09:05

## 2022-05-24 RX ADMIN — MIDAZOLAM HYDROCHLORIDE 2 MG: 1 INJECTION, SOLUTION INTRAMUSCULAR; INTRAVENOUS at 09:05

## 2022-05-24 RX ADMIN — LIDOCAINE HYDROCHLORIDE 80 MG: 20 INJECTION, SOLUTION EPIDURAL; INFILTRATION; INTRACAUDAL; PERINEURAL at 09:05

## 2022-05-24 RX ADMIN — PROPOFOL 110 MG: 10 INJECTION, EMULSION INTRAVENOUS at 09:05

## 2022-05-24 RX ADMIN — SUGAMMADEX 200 MG: 100 INJECTION, SOLUTION INTRAVENOUS at 11:05

## 2022-05-24 NOTE — ANESTHESIA PROCEDURE NOTES
Intubation    Date/Time: 5/24/2022 9:40 AM  Performed by: Keith Henning CRNA  Authorized by: Justin Carrera MD     Intubation:     Induction:  Intravenous    Intubated:  Postinduction    Mask Ventilation:  Easy mask    Attempts:  1    Attempted By:  CRNA    Method of Intubation:  Video laryngoscopy    Blade:  Crane 3    Laryngeal View Grade: Grade I - full view of cords      Difficult Airway Encountered?: No      Complications:  None    Airway Device:  Oral endotracheal tube    Airway Device Size:  7.0    Style/Cuff Inflation:  Cuffed    Tube secured:  21    Secured at:  The lips    Placement Verified By:  Capnometry    Complicating Factors:  None    Findings Post-Intubation:  BS equal bilateral and atraumatic/condition of teeth unchanged

## 2022-05-24 NOTE — ANESTHESIA POSTPROCEDURE EVALUATION
Anesthesia Post Evaluation    Patient: Naye Gallegos    Procedure(s) Performed: Procedure(s) (LRB):  MASTECTOMY, PARTIAL (Left)    Final Anesthesia Type: general      Patient location during evaluation: PACU  Patient participation: Yes- Able to Participate  Level of consciousness: awake and alert, oriented and awake  Post-procedure vital signs: reviewed and stable  Pain management: adequate  Airway patency: patent    PONV status at discharge: No PONV  Anesthetic complications: no      Cardiovascular status: blood pressure returned to baseline, hemodynamically stable and stable  Respiratory status: unassisted, spontaneous ventilation and room air  Hydration status: euvolemic  Follow-up not needed.          Vitals Value Taken Time   /70 05/24/22 1232   Temp 36.4 °C (97.5 °F) 05/24/22 1215   Pulse 84 05/24/22 1235   Resp 21 05/24/22 1235   SpO2 95 % 05/24/22 1235   Vitals shown include unvalidated device data.      No case tracking events are documented in the log.      Pain/Lennox Score: Pain Rating Prior to Med Admin: 2 (5/24/2022 11:39 AM)  Lennox Score: 9 (5/24/2022 12:15 PM)

## 2022-05-24 NOTE — OP NOTE
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  The left breast chest were prepped and draped sterile fashion.  Patient is using the localizer identified the area of maximum intensity 1 o'clock position but 2 cm superior lateral to the areolar margin.  I made a circumareolar incision from tended to.  I developed the space between is soft tissue and breast tissue circumferentially.  Placed a clip at the area of maximum intensity and anteriorly.  And then did a generous biopsy down to the chest wall circumferentially taking minimal a 15 mm margins.  Biopsy cavity is widely excised.  Once the specimen was removed oriented with colors.  I placed 2 stay staples the medial margin which were inadvertently left in place and with the specimen.  X-rays specimen showed what I felt to be satisfactory margins.  I elevated the breast off the chest wall circumferentially all layers were infiltrated Exparel and Marcaine.  I closed the posterior layer with interrupted 2-0 Vicryl sutures.  Placed a 3 x 3 cm BioZorb and which was sewn in place interrupted 4-0 Vicryl sutures.  Then reapproximated breast tissue over the BioZorb with interrupted 3-0 Vicryl sutures and the skin incisions closed with 4-0 Vicryl and 4-0 Stratafix.  Patient tolerated procedure well left the operating room stable condition indication.

## 2022-05-24 NOTE — ANESTHESIA PREPROCEDURE EVALUATION
2022  Naye Gallegos is a 66 y.o., female.      Patient Active Problem List   Diagnosis    Elevated liver enzymes    NAFLD (nonalcoholic fatty liver disease)       Past Surgical History:   Procedure Laterality Date    bilateral cataract surgery      BREAST BIOPSY Bilateral     BREAST CYST ASPIRATION Bilateral      SECTION          Tobacco Use:  The patient  reports that she has never smoked. She has never used smokeless tobacco.     Results for orders placed or performed during the hospital encounter of 22   EKG 12-lead    Collection Time: 22  2:18 PM    Narrative    Test Reason : Z01.818,    Vent. Rate : 099 BPM     Atrial Rate : 099 BPM     P-R Int : 166 ms          QRS Dur : 086 ms      QT Int : 338 ms       P-R-T Axes : 059 073 000 degrees     QTc Int : 433 ms    Normal sinus rhythm  Nonspecific ST and T wave abnormality  Abnormal ECG  No previous ECGs available  Confirmed by Leland Carpio MD ([id number]) on 2022 6:27:52 PM    Referred By:  TRAIC           Confirmed By:Leland Carpio MD             Lab Results   Component Value Date    WBC 9.83 2022    HGB 14.9 2022    HCT 45.2 2022    MCV 92 2022     2022     BMP  Lab Results   Component Value Date     (L) 2022    K 3.5 2022     2022    CO2 23 2022    BUN 25 (H) 2022    CREATININE 0.8 2022    CALCIUM 9.5 2022    ANIONGAP 9 2022     (H) 2022    GLU 97 2022     (H) 2019       No results found for this or any previous visit.        Pre-op Assessment    I have reviewed the Patient Summary Reports.     I have reviewed the Nursing Notes. I have reviewed the NPO Status.   I have reviewed the Medications.     Review of Systems  Anesthesia Hx:  No problems with previous Anesthesia Denies Hx of  Anesthetic complications  Denies Family Hx of Anesthesia complications.   Denies Personal Hx of Anesthesia complications.   Social:  Alcohol Use, Non-Smoker    Hematology/Oncology:  Hematology Normal      Current/Recent Cancer. Breast left   EENT/Dental:  EENT/Dental Normal Macular degeneration Eyes: Visual Impairment Has Bilateral and S/P Extraction - Bilateral Catarract    Cardiovascular:   Hypertension, poorly controlled ECG has been reviewed.    Pulmonary:  Pulmonary Normal    Renal/:  Renal/ Normal     Hepatic/GI:   Liver Disease, NAFLD (nonalcoholic fatty liver disease)  Patient states that she can receive Tylenol    Musculoskeletal:   Arthritis  Scoliosis  Patient denies numbness or radicular pain at this time Spine Disorders: cervical Chronic Pain    Neurological:  Neurology Normal    Endocrine:  Endocrine Normal  Obesity / BMI > 30  Dermatological:  Skin Normal    Psych:  Psychiatric Normal           Physical Exam  General: Well nourished, Cooperative, Alert and Oriented    Airway:  Mallampati: III / II  Mouth Opening: Normal  TM Distance: Normal  Tongue: Normal  Neck ROM: Normal ROM    Dental:  Caps / Implants, Intact    Chest/Lungs:  Clear to auscultation, Normal Respiratory Rate    Heart:  Rate: Normal  Rhythm: Regular Rhythm  Sounds: Normal    Abdomen:  Normal, Soft, Nontender        Anesthesia Plan  Type of Anesthesia, risks & benefits discussed:    Anesthesia Type: Gen ETT  Intra-op Monitoring Plan: Standard ASA Monitors  Post Op Pain Control Plan: multimodal analgesia and IV/PO Opioids PRN  Induction:  IV  Airway Plan: Direct and Video  Informed Consent: Informed consent signed with the Patient and all parties understand the risks and agree with anesthesia plan.  All questions answered.   ASA Score: 2  Anesthesia Plan Notes:       GETA  LTA  Benadryl 6.25 mg iv, Decadron 8 mg iv, Zofran 4 mg iv, Pepcid 20 mg iv   Ofirmev 1000 mg iv, Scopolamine Patch     Ready For Surgery From Anesthesia  Perspective.     .

## 2022-05-24 NOTE — TRANSFER OF CARE
"Anesthesia Transfer of Care Note    Patient: Naye Gallegos    Procedure(s) Performed: Procedure(s) (LRB):  MASTECTOMY, PARTIAL (Left)    Patient location: PACU    Anesthesia Type: general    Transport from OR: Transported from OR on room air with adequate spontaneous ventilation    Post pain: adequate analgesia    Post assessment: no apparent anesthetic complications    Post vital signs: stable    Level of consciousness: awake, alert and oriented    Nausea/Vomiting: no nausea/vomiting    Complications: none    Transfer of care protocol was followed      Last vitals:   Visit Vitals  BP (!) 164/78 (BP Location: Left arm, Patient Position: Sitting)   Pulse 93   Temp 36.7 °C (98.1 °F) (Oral)   Resp 18   Ht 5' 9" (1.753 m)   Wt 92.5 kg (203 lb 14.8 oz)   SpO2 95%   Breastfeeding No   BMI 30.11 kg/m²     "

## 2022-06-06 ENCOUNTER — TELEPHONE (OUTPATIENT)
Dept: ONCOLOGY | Facility: CLINIC | Age: 67
End: 2022-06-06

## 2022-06-06 NOTE — TELEPHONE ENCOUNTER
Received path report via fax. Spoke with Dr. Calderon office.  Has a f/u appt with him on 6/7 at 3:00.

## 2022-06-22 ENCOUNTER — PATIENT MESSAGE (OUTPATIENT)
Dept: FAMILY MEDICINE | Facility: CLINIC | Age: 67
End: 2022-06-22

## 2022-06-28 ENCOUNTER — TELEPHONE (OUTPATIENT)
Dept: FAMILY MEDICINE | Facility: CLINIC | Age: 67
End: 2022-06-28

## 2022-06-28 DIAGNOSIS — Z79.899 ENCOUNTER FOR LONG-TERM (CURRENT) USE OF OTHER MEDICATIONS: ICD-10-CM

## 2022-06-28 DIAGNOSIS — K76.0 NAFLD (NONALCOHOLIC FATTY LIVER DISEASE): ICD-10-CM

## 2022-06-28 DIAGNOSIS — I10 ESSENTIAL HYPERTENSION: Primary | ICD-10-CM

## 2022-06-28 DIAGNOSIS — E78.2 MIXED HYPERLIPIDEMIA: ICD-10-CM

## 2022-06-28 NOTE — TELEPHONE ENCOUNTER
----- Message from Abbey Peter LPN sent at 3/24/2022  9:57 AM CDT -----  ----- Message from SULEIMAN Castelan sent at 3/23/2022 12:26 PM CDT -----  Please contact patient and inform her that her lab work was reviewed.   Thyroid and kidney function is well within normal limits. Liver function is near normal limits, with one enzyme (ALT) being slightly elevated at 31, ideally we like this below 29. I recommend limiting your alcohol intake and Tylenol use.  Cholesterol levels are elevated. Tot cholesterol is 263 and LDL is 170. Due to this, I recommend starting a cholesterol lowering medication called Lipitor 10mg, once daily.  If patient is amenable to starting this medication, we will repeat a lipid panel and CMP in 3 months.  RBC, hemoglobin, and hematocrit are slightly elevated (Polycythemia). One way to decrease these levels is by donating blood or having phlebotomy performed. I want to repeat a CBC in 3 months to assess these levels.

## 2022-06-28 NOTE — TELEPHONE ENCOUNTER
Left message that fasting lab is due to recheck cholesterol, orders at [facility], and to try to have drawn in the next 2 weeks. Updated remind me. David wanted CBC as well, but this was done in May for another provider and looks normal. If David wants again, please add, otherwise I just pended CMP and lipid

## 2022-06-29 NOTE — TELEPHONE ENCOUNTER
No need to repeat CBC as it is currently UTD.  Repeat lipid panel and CMP orders have been signed.

## 2022-07-06 ENCOUNTER — PATIENT MESSAGE (OUTPATIENT)
Dept: FAMILY MEDICINE | Facility: CLINIC | Age: 67
End: 2022-07-06

## 2022-07-07 NOTE — TELEPHONE ENCOUNTER
The patient's prescription has been approved and sent to   [facility]/pharmacy #[id number] - [place], [place] - [address]  [address] [id number]  Phone: [phone] Fax: [phone]

## 2022-07-12 ENCOUNTER — PATIENT MESSAGE (OUTPATIENT)
Dept: FAMILY MEDICINE | Facility: CLINIC | Age: 67
End: 2022-07-12

## 2022-08-11 ENCOUNTER — OFFICE VISIT (OUTPATIENT)
Dept: HEMATOLOGY/ONCOLOGY | Facility: CLINIC | Age: 67
End: 2022-08-11
Payer: OTHER GOVERNMENT

## 2022-08-11 VITALS
SYSTOLIC BLOOD PRESSURE: 151 MMHG | WEIGHT: 201 LBS | BODY MASS INDEX: 29.77 KG/M2 | DIASTOLIC BLOOD PRESSURE: 66 MMHG | HEIGHT: 69 IN | HEART RATE: 114 BPM | RESPIRATION RATE: 18 BRPM | TEMPERATURE: 98 F

## 2022-08-11 DIAGNOSIS — Z17.0 MALIGNANT NEOPLASM OF CENTRAL PORTION OF LEFT BREAST IN FEMALE, ESTROGEN RECEPTOR POSITIVE: ICD-10-CM

## 2022-08-11 DIAGNOSIS — C50.112 MALIGNANT NEOPLASM OF CENTRAL PORTION OF LEFT BREAST IN FEMALE, ESTROGEN RECEPTOR POSITIVE: ICD-10-CM

## 2022-08-11 PROCEDURE — 99205 OFFICE O/P NEW HI 60 MIN: CPT | Mod: S$GLB,,, | Performed by: INTERNAL MEDICINE

## 2022-08-11 PROCEDURE — 99205 PR OFFICE/OUTPT VISIT, NEW, LEVL V, 60-74 MIN: ICD-10-PCS | Mod: S$GLB,,, | Performed by: INTERNAL MEDICINE

## 2022-08-11 RX ORDER — MINOCYCLINE HYDROCHLORIDE 100 MG/1
100 CAPSULE ORAL EVERY 12 HOURS
COMMUNITY
Start: 2022-08-08 | End: 2022-09-30

## 2022-08-11 RX ORDER — ANASTROZOLE 1 MG/1
1 TABLET ORAL DAILY
Qty: 30 TABLET | Refills: 6 | Status: SHIPPED | OUTPATIENT
Start: 2022-08-11 | End: 2023-03-13

## 2022-08-11 RX ORDER — SULFAMETHOXAZOLE AND TRIMETHOPRIM 800; 160 MG/1; MG/1
1 TABLET ORAL 2 TIMES DAILY
COMMUNITY
Start: 2022-08-08 | End: 2022-09-30

## 2022-08-11 NOTE — PROGRESS NOTES
INITIAL [facility] HEM/ONC CONSULTATION      Subjective:       Patient ID: Naye Gallegos is a 66 y.o. female.    3/23/2022:  Grouped microcalcification in left breast.     3/30/2022:  Left breast microcalcifications at 12 oclock  indeterm masses at 10 oclock at 5 and 6 cm from nipple.   Mixed echogenicity mass at 10 oclock 2 cm from nipple    4/12/2022:  MRI Breast Impression:   1. 17 x 16 x 10 mm irregular non masslike enhancement at 12:00 o'clock position of left breast 5 cm from the nipple correlates with site of recently identified pleomorphic microcalcifications and is suspicious for malignancy.  Previous recommendation for stereotactic biopsy of the microcalcifications is unchanged.  2. Previous indeterminate hypoechoic masses in left breast near 10 o'clock position correlate with foci of fat necrosis, requiring no additional workup or follow-up.  3. Moderate bilateral background parenchymal enhancement, within numerable cysts throughout bilateral breasts, suggesting fibrocystic change.  Recommendation: Stereotactic biopsy of left breast pleomorphic microcalcifications    4/26/2022:  Needle biopsy:  Left breast 5cm from nipple  DCIS  ER: 75%, NE: 75%,    5/24/2022:  Left breast partial mastectomy:   LEFT BREAST, PARTIAL MASTECTOMY:   - MICROINVASIVE CARCINOMA IN THE BACKGROUND OF EXTENSIVE HIGH AND    INTERMEDIATE NUCLEAR   GRADE DUCTAL CARCINOMA IN SITU, CRIBRIFORM, MICROPAPILLARY AND SOLID    AND COMEDOCARCINOMA.   - SURGICAL MARGINS NEGATIVE FOR INVASIVE CARCINOMA.   - DUCTAL CARCINOMA INVOLVES SUPERIOR AND INFERIOR SURGICAL MARGINS (SEE    CASE SUMMARY     FOR FURTHER SURGICAL MARGIN EVALUATION).   - RADIAL SCLEROSING LESION.   - ORGANIZING PREVIOUS BIOPSY SITE.    Receptors on invasive: ER: 20%, NE 0%    7/18/2022:  Bilateral Mastectomy:  Left: DCIS in far medial breast.  Margins widely clear.  SLN negative    Right:  Benign.    Plan: Begin Ansastrozole 9/1/2022        Chief Complaint: No chief complaint  on file.      Ms. Gallegos is a 65yo female who presented with an abnormal screening mammogram in 2022.  She underwent diagnostic and MRI which confirmed this left sided lesion.  Biopsy showed DCIS and she underwent lumpectomy however the margins were positive.  She then proceeded to bilateral mastectomy and reconstruction and margins are all now negative however, she was found to have microinvasive disease on the lumpectomy specimen.      Patient gives a PMH of multiple breast biopsies since she started getting mammograms in her 30s.      She is here today for further recommendations.      Patients mother(72yo) and grandmother(38yo) both had breast cancer. Her grandmothers sister also had breast cancer(80s).  Patient has no sisters or female cousins.    Patients father  at 50 in accidental death.  2 cousins have had melanoma and her brother has had melanoma.        Past Medical History:   Diagnosis Date    Arthritis     Cancer     DCIS left breast    Hypertension     Macular degeneration     stable    Scoliosis        Past Surgical History:   Procedure Laterality Date    bilateral cataract surgery      BREAST BIOPSY Bilateral     BREAST CYST ASPIRATION Bilateral      SECTION      MASTECTOMY, PARTIAL Left 2022    Procedure: MASTECTOMY, PARTIAL;  Surgeon: Chanel Calderon MD;  Location: [facility] OR;  Service: General;  Laterality: Left;  LOCALIZER CLIP ON  AT [facility]       Social History     Socioeconomic History    Marital status:    Tobacco Use    Smoking status: Never Smoker    Smokeless tobacco: Never Used   Substance and Sexual Activity    Alcohol use: Yes     Alcohol/week: 2.0 - 3.0 standard drinks     Types: 2 - 3 Glasses of wine per week    Drug use: Never       Family History   Problem Relation Age of Onset    Cancer Mother     Breast cancer Mother     Breast cancer Maternal Grandmother        Review of patient's allergies indicates:   Allergen Reactions     Cheese      Other reaction(s): Headache   BLUE CHEESE       Current Outpatient Medications:     atorvastatin (LIPITOR) 10 MG tablet, Take 1 tablet (10 mg total) by mouth once daily., Disp: 90 tablet, Rfl: 1    minocycline (MINOCIN,DYNACIN) 100 MG capsule, Take 100 mg by mouth every 12 (twelve) hours., Disp: , Rfl:     sulfamethoxazole-trimethoprim 800-160mg (BACTRIM DS) 800-160 mg Tab, Take 1 tablet by mouth 2 (two) times daily., Disp: , Rfl:     telmisartan-hydrochlorothiazide (MICARDIS HCT) 40-12.5 mg per tablet, Take 1 tablet by mouth once daily., Disp: 90 tablet, Rfl: 1    vit C/E/Zn/coppr/lutein/zeaxan (OCUVITE LUTEIN AND ZEAXANTHIN ORAL), Take 2 capsules by mouth once daily., Disp: , Rfl:     anastrozole (ARIMIDEX) 1 mg Tab, Take 1 tablet (1 mg total) by mouth once daily., Disp: 30 tablet, Rfl: 6    HYDROcodone-acetaminophen (NORCO) 5-325 mg per tablet, Take 1 tablet by mouth every 8 (eight) hours as needed for Pain., Disp: 18 tablet, Rfl: 0    nirmatrelvir-ritonavir 150 mg x 2- 100 mg copackaged tablets (EUA), Take 3 tablets by mouth 2 (two) times daily. Each dose contains 2 nirmatrelvir (pink tablets) and 1 ritonavir (white tablet). Take all 3 tablets together, Disp: 30 tablet, Rfl: 0    All medications and past history have been reviewed.    Review of Systems   Constitutional: Negative for activity change, appetite change, diaphoresis, fatigue, fever and unexpected weight change.   HENT: Negative for congestion, hearing loss and mouth sores.    Eyes: Negative for visual disturbance.   Respiratory: Negative for cough, chest tightness and shortness of breath.    Cardiovascular: Negative for chest pain and leg swelling.   Gastrointestinal: Negative for abdominal pain, blood in stool, diarrhea, nausea and vomiting.   Endocrine: Negative for cold intolerance and heat intolerance.   Genitourinary: Negative for difficulty urinating, dysuria, frequency and hematuria.   Musculoskeletal: Negative for back  "pain.   Skin: Negative for rash.   Neurological: Negative for dizziness and headaches.   Hematological: Negative for adenopathy. Does not bruise/bleed easily.   Psychiatric/Behavioral: Negative for behavioral problems. The patient is not nervous/anxious.        Objective:        BP (!) 151/66   Pulse (!) 114   Temp 98.1 °F (36.7 °C)   Resp 18   Ht 5' 9" (1.753 m)   Wt 91.2 kg (201 lb)   BMI 29.68 kg/m²     Physical Exam  Constitutional:       Appearance: She is well-developed.   HENT:      Head: Normocephalic and atraumatic.      Right Ear: External ear normal.      Left Ear: External ear normal.   Eyes:      Conjunctiva/sclera: Conjunctivae normal.      Pupils: Pupils are equal, round, and reactive to light.   Neck:      Thyroid: No thyromegaly.      Trachea: No tracheal deviation.   Cardiovascular:      Rate and Rhythm: Normal rate and regular rhythm.      Heart sounds: Normal heart sounds.   Pulmonary:      Effort: Pulmonary effort is normal.      Breath sounds: Normal breath sounds.   Abdominal:      General: Bowel sounds are normal. There is no distension.      Palpations: Abdomen is soft. There is no mass.      Tenderness: There is no abdominal tenderness.   Skin:     Findings: No rash.   Neurological:      Comments: Neuro intact througout   Psychiatric:         Behavior: Behavior normal.         Thought Content: Thought content normal.         Judgment: Judgment normal.           Lab  No results found for this or any previous visit (from the past 336 hour(s)).  CMP  Sodium   Date Value Ref Range Status   05/19/2022 135 (L) 136 - 145 mmol/L Final     Potassium   Date Value Ref Range Status   05/19/2022 3.5 3.5 - 5.1 mmol/L Final     Chloride   Date Value Ref Range Status   05/19/2022 103 95 - 110 mmol/L Final     CO2   Date Value Ref Range Status   05/19/2022 23 23 - 29 mmol/L Final     Glucose   Date Value Ref Range Status   05/19/2022 114 (H) 70 - 110 mg/dL Final     BUN   Date Value Ref Range Status "   05/19/2022 25 (H) 8 - 23 mg/dL Final     Creatinine   Date Value Ref Range Status   05/19/2022 0.8 0.5 - 1.4 mg/dL Final     Calcium   Date Value Ref Range Status   05/19/2022 9.5 8.7 - 10.5 mg/dL Final     Total Protein   Date Value Ref Range Status   03/21/2022 7.3 6.1 - 8.1 g/dL Final     Albumin   Date Value Ref Range Status   03/21/2022 4.7 3.6 - 5.1 g/dL Final     Total Bilirubin   Date Value Ref Range Status   03/21/2022 0.7 0.2 - 1.2 mg/dL Final     Alkaline Phosphatase   Date Value Ref Range Status   11/21/2019 90 33 - 130 U/L Final     AST   Date Value Ref Range Status   03/21/2022 21 10 - 35 U/L Final     ALT   Date Value Ref Range Status   03/21/2022 31 (H) 6 - 29 U/L Final     Anion Gap   Date Value Ref Range Status   05/19/2022 9 8 - 16 mmol/L Final     eGFR if    Date Value Ref Range Status   05/19/2022 >60.0 >60 mL/min/1.73 m^2 Final     eGFR if non    Date Value Ref Range Status   05/19/2022 >60.0 >60 mL/min/1.73 m^2 Final     Comment:     Calculation used to obtain the estimated glomerular filtration  rate (eGFR) is the CKD-EPI equation.            Specimen (24h ago, onward)            None                All lab results and imaging results have been reviewed and discussed with the patient.     Assessment:       1. Microinvasive Left Breast Cancer      Problem List Items Addressed This Visit     Microinvasive Left Breast Cancer     I had a long discussion with Ms. Gallegos today and reviewed her path in detail.  It appears that the invasive component is ER pos at 20% but I need to call path to confirm this.  Also,  There appears to be no HER 2 reporting.  I discussed that in her situation,  The best approach moving forward would be to try a course of Anastrozole.  She has no osteopenia and if she tolerates this well then will continue as there is some small benefit she may get.  If however, this causes significant issues in QOL or she has some other side effect,  then I would hold this med.  I suspect she will do very well overall given her microinvasive status and being s/p bilateral mastectomy.  She will need no chemotherapy or radiation here and I discussed this as well.  Will begin AI and will have her back in the first week of October to see how she is tolerating this medication.  Spent over one hour in pre-visit review, patient visit and post-visit charting.            Relevant Medications    anastrozole (ARIMIDEX) 1 mg Tab        Cancer Staging  No matching staging information was found for the patient.      Plan:         Follow up in about 8 weeks (around 10/5/2022).       The plan was discussed with the patient and all questions/concerns have been answered to the patient's satisfaction.

## 2022-08-11 NOTE — ASSESSMENT & PLAN NOTE
I had a long discussion with Ms. Gallegos today and reviewed her path in detail.  It appears that the invasive component is ER pos at 20% but I need to call path to confirm this.  Also,  There appears to be no HER 2 reporting.  I discussed that in her situation,  The best approach moving forward would be to try a course of Anastrozole.  She has no osteopenia and if she tolerates this well then will continue as there is some small benefit she may get.  If however, this causes significant issues in QOL or she has some other side effect, then I would hold this med.  I suspect she will do very well overall given her microinvasive status and being s/p bilateral mastectomy.  She will need no chemotherapy or radiation here and I discussed this as well.  Will begin AI and will have her back in the first week of October to see how she is tolerating this medication.  Spent over one hour in pre-visit review, patient visit and post-visit charting.

## 2022-09-01 ENCOUNTER — TELEPHONE (OUTPATIENT)
Dept: FAMILY MEDICINE | Facility: CLINIC | Age: 67
End: 2022-09-01

## 2022-09-12 ENCOUNTER — TELEPHONE (OUTPATIENT)
Dept: FAMILY MEDICINE | Facility: CLINIC | Age: 67
End: 2022-09-12

## 2022-09-13 ENCOUNTER — PATIENT MESSAGE (OUTPATIENT)
Dept: FAMILY MEDICINE | Facility: CLINIC | Age: 67
End: 2022-09-13

## 2022-09-30 ENCOUNTER — OFFICE VISIT (OUTPATIENT)
Dept: FAMILY MEDICINE | Facility: CLINIC | Age: 67
End: 2022-09-30
Payer: OTHER GOVERNMENT

## 2022-09-30 VITALS
SYSTOLIC BLOOD PRESSURE: 158 MMHG | BODY MASS INDEX: 29.33 KG/M2 | WEIGHT: 198 LBS | DIASTOLIC BLOOD PRESSURE: 70 MMHG | HEIGHT: 69 IN

## 2022-09-30 DIAGNOSIS — Z23 INFLUENZA VACCINE ADMINISTERED: ICD-10-CM

## 2022-09-30 DIAGNOSIS — Z79.899 LONG-TERM USE OF HIGH-RISK MEDICATION: ICD-10-CM

## 2022-09-30 DIAGNOSIS — E78.2 MIXED HYPERLIPIDEMIA: ICD-10-CM

## 2022-09-30 DIAGNOSIS — I10 ESSENTIAL HYPERTENSION: ICD-10-CM

## 2022-09-30 DIAGNOSIS — D05.12 DUCTAL CARCINOMA IN SITU (DCIS) OF LEFT BREAST: ICD-10-CM

## 2022-09-30 DIAGNOSIS — Z00.00 ANNUAL PHYSICAL EXAM: Primary | ICD-10-CM

## 2022-09-30 LAB
ALBUMIN SERPL-MCNC: 4.5 G/DL (ref 3.6–5.1)
ALBUMIN/GLOB SERPL: 1.7 (CALC) (ref 1–2.5)
ALP SERPL-CCNC: 121 U/L (ref 37–153)
ALT SERPL-CCNC: 26 U/L (ref 6–29)
AST SERPL-CCNC: 18 U/L (ref 10–35)
BASOPHILS # BLD AUTO: 31 CELLS/UL (ref 0–200)
BASOPHILS NFR BLD AUTO: 0.5 %
BILIRUB SERPL-MCNC: 0.5 MG/DL (ref 0.2–1.2)
BUN SERPL-MCNC: 17 MG/DL (ref 7–25)
BUN/CREAT SERPL: NORMAL (CALC) (ref 6–22)
CALCIUM SERPL-MCNC: 9.5 MG/DL (ref 8.6–10.4)
CHLORIDE SERPL-SCNC: 103 MMOL/L (ref 98–110)
CHOLEST SERPL-MCNC: 171 MG/DL
CHOLEST/HDLC SERPL: 2.8 (CALC)
CO2 SERPL-SCNC: 29 MMOL/L (ref 20–32)
CREAT SERPL-MCNC: 0.75 MG/DL (ref 0.5–1.05)
EGFR: 88 ML/MIN/1.73M2
EOSINOPHIL # BLD AUTO: 521 CELLS/UL (ref 15–500)
EOSINOPHIL NFR BLD AUTO: 8.4 %
ERYTHROCYTE [DISTWIDTH] IN BLOOD BY AUTOMATED COUNT: 13.5 % (ref 11–15)
GLOBULIN SER CALC-MCNC: 2.6 G/DL (CALC) (ref 1.9–3.7)
GLUCOSE SERPL-MCNC: 97 MG/DL (ref 65–99)
HCT VFR BLD AUTO: 42 % (ref 35–45)
HDLC SERPL-MCNC: 61 MG/DL
HGB BLD-MCNC: 13.2 G/DL (ref 11.7–15.5)
LDLC SERPL CALC-MCNC: 94 MG/DL (CALC)
LYMPHOCYTES # BLD AUTO: 1500 CELLS/UL (ref 850–3900)
LYMPHOCYTES NFR BLD AUTO: 24.2 %
MCH RBC QN AUTO: 25.4 PG (ref 27–33)
MCHC RBC AUTO-ENTMCNC: 31.4 G/DL (ref 32–36)
MCV RBC AUTO: 80.9 FL (ref 80–100)
MONOCYTES # BLD AUTO: 378 CELLS/UL (ref 200–950)
MONOCYTES NFR BLD AUTO: 6.1 %
NEUTROPHILS # BLD AUTO: 3770 CELLS/UL (ref 1500–7800)
NEUTROPHILS NFR BLD AUTO: 60.8 %
NONHDLC SERPL-MCNC: 110 MG/DL (CALC)
PLATELET # BLD AUTO: 330 THOUSAND/UL (ref 140–400)
PMV BLD REES-ECKER: 11.8 FL (ref 7.5–12.5)
POTASSIUM SERPL-SCNC: 3.6 MMOL/L (ref 3.5–5.3)
PROT SERPL-MCNC: 7.1 G/DL (ref 6.1–8.1)
RBC # BLD AUTO: 5.19 MILLION/UL (ref 3.8–5.1)
SODIUM SERPL-SCNC: 139 MMOL/L (ref 135–146)
TRIGL SERPL-MCNC: 70 MG/DL
WBC # BLD AUTO: 6.2 THOUSAND/UL (ref 3.8–10.8)

## 2022-09-30 PROCEDURE — 90662 IIV NO PRSV INCREASED AG IM: CPT | Mod: S$GLB,,, | Performed by: FAMILY MEDICINE

## 2022-09-30 PROCEDURE — 99397 PR PREVENTIVE VISIT,EST,65 & OVER: ICD-10-PCS | Mod: 25,S$GLB,, | Performed by: FAMILY MEDICINE

## 2022-09-30 PROCEDURE — 99397 PER PM REEVAL EST PAT 65+ YR: CPT | Mod: 25,S$GLB,, | Performed by: FAMILY MEDICINE

## 2022-09-30 PROCEDURE — 90471 IMMUNIZATION ADMIN: CPT | Mod: S$GLB,,, | Performed by: FAMILY MEDICINE

## 2022-09-30 PROCEDURE — 90471 FLU VACCINE - QUADRIVALENT - HIGH DOSE (65+) PRESERVATIVE FREE IM: ICD-10-PCS | Mod: S$GLB,,, | Performed by: FAMILY MEDICINE

## 2022-09-30 PROCEDURE — 90662 FLU VACCINE - QUADRIVALENT - HIGH DOSE (65+) PRESERVATIVE FREE IM: ICD-10-PCS | Mod: S$GLB,,, | Performed by: FAMILY MEDICINE

## 2022-09-30 NOTE — PROGRESS NOTES
SUBJECTIVE:    Patient ID: Naye Gallegos is a 66 y.o. female.    Chief Complaint: Hypertension (Did not bring bottles//needs refills//will take flu shot//bs) and Hyperlipidemia    Pt here for annual exam.    Since last visit, pt has had breast cancer (3/2022), mastectomy, and restorative breast surgery. Pt did not have to do any chemo or radiation.  She is on anastrozole. (Omari)      [place] [place]    Had hx of elevated liver enzymes, which went away after she stopped taking MVI.    Walks for exercise. Has retired since last visit.    Had labs done: fBS 97, GFR 88, LDL 94  -------------------------------------------------------------------------------  Pap by Dr. ELEN Lopez  Has a hx macular degeneration.  Seeing Dr. Bhardwaj.   Taking AREDS 2.        Lab Visit on 05/20/2022   Component Date Value Ref Range Status    Specimen UA 05/20/2022 Urine, Clean Catch   Final    Color, UA 05/20/2022 Yellow  Yellow, Straw, Mag Final    Appearance, UA 05/20/2022 Clear  Clear Final    pH, UA 05/20/2022 6.0  5.0 - 8.0 Final    Specific Gravity, UA 05/20/2022 1.015  1.005 - 1.030 Final    Protein, UA 05/20/2022 Negative  Negative Final    Comment: Recommend a 24 hour urine protein or a urine   protein/creatinine ratio if globulin induced proteinuria is  clinically suspected.      Glucose, UA 05/20/2022 Negative  Negative Final    Ketones, UA 05/20/2022 Negative  Negative Final    Bilirubin (UA) 05/20/2022 Negative  Negative Final    Occult Blood UA 05/20/2022 Negative  Negative Final    Nitrite, UA 05/20/2022 Negative  Negative Final    Urobilinogen, UA 05/20/2022 Negative  Negative EU/dL Final    Leukocytes, UA 05/20/2022 Negative  Negative Final   Lab Visit on 05/19/2022   Component Date Value Ref Range Status    WBC 05/19/2022 9.83  3.90 - 12.70 K/uL Final    RBC 05/19/2022 4.91  4.00 - 5.40 M/uL Final    Hemoglobin 05/19/2022 14.9  12.0 - 16.0 g/dL Final     Hematocrit 05/19/2022 45.2  37.0 - 48.5 % Final    MCV 05/19/2022 92  82 - 98 fL Final    MCH 05/19/2022 30.3  27.0 - 31.0 pg Final    MCHC 05/19/2022 33.0  32.0 - 36.0 g/dL Final    RDW 05/19/2022 12.5  11.5 - 14.5 % Final    Platelets 05/19/2022 279  150 - 450 K/uL Final    MPV 05/19/2022 11.3  9.2 - 12.9 fL Final    Immature Granulocytes 05/19/2022 0.2  0.0 - 0.5 % Final    Gran # (ANC) 05/19/2022 7.5  1.8 - 7.7 K/uL Final    Immature Grans (Abs) 05/19/2022 0.02  0.00 - 0.04 K/uL Final    Comment: Mild elevation in immature granulocytes is non specific and   can be seen in a variety of conditions including stress response,   acute inflammation, trauma and pregnancy. Correlation with other   laboratory and clinical findings is essential.      Lymph # 05/19/2022 1.4  1.0 - 4.8 K/uL Final    Mono # 05/19/2022 0.6  0.3 - 1.0 K/uL Final    Eos # 05/19/2022 0.2  0.0 - 0.5 K/uL Final    Baso # 05/19/2022 0.03  0.00 - 0.20 K/uL Final    nRBC 05/19/2022 0  0 /100 WBC Final    Gran % 05/19/2022 76.8 (H)  38.0 - 73.0 % Final    Lymph % 05/19/2022 13.9 (L)  18.0 - 48.0 % Final    Mono % 05/19/2022 6.4  4.0 - 15.0 % Final    Eosinophil % 05/19/2022 2.4  0.0 - 8.0 % Final    Basophil % 05/19/2022 0.3  0.0 - 1.9 % Final    Differential Method 05/19/2022 Automated   Final    Sodium 05/19/2022 135 (L)  136 - 145 mmol/L Final    Potassium 05/19/2022 3.5  3.5 - 5.1 mmol/L Final    Chloride 05/19/2022 103  95 - 110 mmol/L Final    CO2 05/19/2022 23  23 - 29 mmol/L Final    Glucose 05/19/2022 114 (H)  70 - 110 mg/dL Final    BUN 05/19/2022 25 (H)  8 - 23 mg/dL Final    Creatinine 05/19/2022 0.8  0.5 - 1.4 mg/dL Final    Calcium 05/19/2022 9.5  8.7 - 10.5 mg/dL Final    Anion Gap 05/19/2022 9  8 - 16 mmol/L Final    eGFR if African American 05/19/2022 >60.0  >60 mL/min/1.73 m^2 Final    eGFR if non African American 05/19/2022 >60.0  >60 mL/min/1.73 m^2 Final    Comment: Calculation used to obtain the estimated glomerular  "filtration  rate (eGFR) is the CKD-EPI equation.      Hospital Outpatient Visit on 04/26/2022   Component Date Value Ref Range Status    Final Pathologic Diagnosis 04/26/2022    Final                    Value:Left breast, 12:00, 5 cm from the nipple, stereotactic core biopsy:  Ductal carcinoma in Situ, high nuclear grade, solid pattern, with  comedonecrosis and microcalcifications  Carcinoma in Situ measures 11 mm in greatest contiguous focus  Tumor biomarkers  Estrogen receptor (ER):  Positive, 70-75 %, intermediate to strong  Progesterone receptor (PGR):  Positive, 70-75%, intermediate to strong  Additional IHC:  P63:  Positive for retention of myoepithelial cells, supporting the above  diagnosis.  All immunostains were performed with appropriate controls.  This case was reviewed by Dr. RUBIA Noonan who concurs with the above diagnosis.      Interp By Reva Santiago M.D., Signed on 05/03/2022 at 11:02    Gross 04/26/2022    Final                    Value:Container Label: Clinic Number/AP Number:  [id number], and "left breast, sphere  clip"  Received fresh is a 3.8 x 3.0 x 0.3 cm aggregate of fibroadipose tissue  fragments.  Ischemic time is not provided.  Specimen is in formalin 6-72  hours.  A photograph is taken.  Entirely submitted in [id number]C.  Magdi Palencia, P.A.      Disclaimer 04/26/2022    Final                    Value:Unless the case is a 'gross only' or additional testing only, the final  diagnosis for each specimen is based on a microscopic examination of  appropriate tissue sections.  ER immunohistochemical staining (clone SP1, DAB detection method) is  performed on formalin-fixed, paraffin embedded tissue sections. The  percentage of cell nuclei stained and the strength of staining is reported  (weak, moderate, strong), using the 2010 ACSO/CAP scoring guidelines. Tumors  used for determing predictive markers are fixed in10% neutral buffered  formalin for 6-72 hours. It has been cleared by the U.S. FDA for use " as an  IVD test. This assay has not been validated on decalcified tissues. Results  should be interpreted with caution given the likelihood of false negativity  on decalcified specimens.     Patient Message on 03/23/2022   Component Date Value Ref Range Status    Glucose 09/29/2022 97  65 - 99 mg/dL Final    Comment:               Fasting reference interval         BUN 09/29/2022 17  7 - 25 mg/dL Final    Creatinine 09/29/2022 0.75  0.50 - 1.05 mg/dL Final    EGFR 09/29/2022 88  > OR = 60 mL/min/1.73m2 Final    Comment: The eGFR is based on the CKD-EPI 2021 equation. To calculate   the new eGFR from a previous Creatinine or Cystatin C  result, go to https://www.kidney.org/professionals/  kdoqi/gfr%5Fcalculator      BUN/Creatinine Ratio 09/29/2022 NOT APPLICABLE  6 - 22 (calc) Final    Sodium 09/29/2022 139  135 - 146 mmol/L Final    Potassium 09/29/2022 3.6  3.5 - 5.3 mmol/L Final    Chloride 09/29/2022 103  98 - 110 mmol/L Final    CO2 09/29/2022 29  20 - 32 mmol/L Final    Calcium 09/29/2022 9.5  8.6 - 10.4 mg/dL Final    Total Protein 09/29/2022 7.1  6.1 - 8.1 g/dL Final    Albumin 09/29/2022 4.5  3.6 - 5.1 g/dL Final    Globulin, Total 09/29/2022 2.6  1.9 - 3.7 g/dL (calc) Final    Albumin/Globulin Ratio 09/29/2022 1.7  1.0 - 2.5 (calc) Final    Total Bilirubin 09/29/2022 0.5  0.2 - 1.2 mg/dL Final    Alkaline Phosphatase 09/29/2022 121  37 - 153 U/L Final    AST 09/29/2022 18  10 - 35 U/L Final    ALT 09/29/2022 26  6 - 29 U/L Final    Cholesterol 09/29/2022 171  <200 mg/dL Final    HDL 09/29/2022 61  > OR = 50 mg/dL Final    Triglycerides 09/29/2022 70  <150 mg/dL Final    LDL Cholesterol 09/29/2022 94  mg/dL (calc) Final    Comment: Reference range: <100     Desirable range <100 mg/dL for primary prevention;    <70 mg/dL for patients with CHD or diabetic patients   with > or = 2 CHD risk factors.     LDL-C is now calculated using the Be-Vyas   calculation, which is a validated novel method providing    better accuracy than the Friedewald equation in the   estimation of LDL-C.   Be SS et al. KAREN. 2013;310(19): 4534-2963   (http://education.VasSol/faq/JYW921)      HDL/Cholesterol Ratio 09/29/2022 2.8  <5.0 (calc) Final    Non HDL Chol. (LDL+VLDL) 09/29/2022 110  <130 mg/dL (calc) Final    Comment: For patients with diabetes plus 1 major ASCVD risk   factor, treating to a non-HDL-C goal of <100 mg/dL   (LDL-C of <70 mg/dL) is considered a therapeutic   option.      WBC 09/29/2022 6.2  3.8 - 10.8 Thousand/uL Final    RBC 09/29/2022 5.19 (H)  3.80 - 5.10 Million/uL Final    Hemoglobin 09/29/2022 13.2  11.7 - 15.5 g/dL Final    Hematocrit 09/29/2022 42.0  35.0 - 45.0 % Final    MCV 09/29/2022 80.9  80.0 - 100.0 fL Final    MCH 09/29/2022 25.4 (L)  27.0 - 33.0 pg Final    MCHC 09/29/2022 31.4 (L)  32.0 - 36.0 g/dL Final    RDW 09/29/2022 13.5  11.0 - 15.0 % Final    Platelets 09/29/2022 330  140 - 400 Thousand/uL Final    MPV 09/29/2022 11.8  7.5 - 12.5 fL Final    Neutrophils, Abs 09/29/2022 3,770  1,500 - 7,800 cells/uL Final    Lymph # 09/29/2022 1,500  850 - 3,900 cells/uL Final    Mono # 09/29/2022 378  200 - 950 cells/uL Final    Eos # 09/29/2022 521 (H)  15 - 500 cells/uL Final    Baso # 09/29/2022 31  0 - 200 cells/uL Final    Neutrophils Relative 09/29/2022 60.8  % Final    Lymph % 09/29/2022 24.2  % Final    Mono % 09/29/2022 6.1  % Final    Eosinophil % 09/29/2022 8.4  % Final    Basophil % 09/29/2022 0.5  % Final   Office Visit on 03/21/2022   Component Date Value Ref Range Status    WBC 03/21/2022 4.6  3.8 - 10.8 Thousand/uL Final    RBC 03/21/2022 5.23 (H)  3.80 - 5.10 Million/uL Final    Hemoglobin 03/21/2022 15.9 (H)  11.7 - 15.5 g/dL Final    Hematocrit 03/21/2022 47.8 (H)  35.0 - 45.0 % Final    MCV 03/21/2022 91.4  80.0 - 100.0 fL Final    MCH 03/21/2022 30.4  27.0 - 33.0 pg Final    MCHC 03/21/2022 33.3  32.0 - 36.0 g/dL Final    RDW 03/21/2022 12.1  11.0 - 15.0 % Final     Platelets 03/21/2022 249  140 - 400 Thousand/uL Final    MPV 03/21/2022 12.4  7.5 - 12.5 fL Final    Neutrophils, Abs 03/21/2022 2,723  1,500 - 7,800 cells/uL Final    Lymph # 03/21/2022 1,288  850 - 3,900 cells/uL Final    Mono # 03/21/2022 290  200 - 950 cells/uL Final    Eos # 03/21/2022 258  15 - 500 cells/uL Final    Baso # 03/21/2022 41  0 - 200 cells/uL Final    Neutrophils Relative 03/21/2022 59.2  % Final    Lymph % 03/21/2022 28.0  % Final    Mono % 03/21/2022 6.3  % Final    Eosinophil % 03/21/2022 5.6  % Final    Basophil % 03/21/2022 0.9  % Final    Glucose 03/21/2022 97  65 - 99 mg/dL Final    Comment:               Fasting reference interval         BUN 03/21/2022 17  7 - 25 mg/dL Final    Creatinine 03/21/2022 0.76  0.50 - 0.99 mg/dL Final    Comment: For patients >49 years of age, the reference limit  for Creatinine is approximately 13% higher for people  identified as -American.         eGFR if non African American 03/21/2022 82  > OR = 60 mL/min/1.73m2 Final    eGFR if African American 03/21/2022 95  > OR = 60 mL/min/1.73m2 Final    BUN/Creatinine Ratio 03/21/2022 NOT APPLICABLE  6 - 22 (calc) Final    Sodium 03/21/2022 142  135 - 146 mmol/L Final    Potassium 03/21/2022 3.9  3.5 - 5.3 mmol/L Final    Chloride 03/21/2022 105  98 - 110 mmol/L Final    CO2 03/21/2022 28  20 - 32 mmol/L Final    Calcium 03/21/2022 9.8  8.6 - 10.4 mg/dL Final    Total Protein 03/21/2022 7.3  6.1 - 8.1 g/dL Final    Albumin 03/21/2022 4.7  3.6 - 5.1 g/dL Final    Globulin, Total 03/21/2022 2.6  1.9 - 3.7 g/dL (calc) Final    Albumin/Globulin Ratio 03/21/2022 1.8  1.0 - 2.5 (calc) Final    Total Bilirubin 03/21/2022 0.7  0.2 - 1.2 mg/dL Final    Alkaline Phosphatase 03/21/2022 100  37 - 153 U/L Final    AST 03/21/2022 21  10 - 35 U/L Final    ALT 03/21/2022 31 (H)  6 - 29 U/L Final    Cholesterol 03/21/2022 263 (H)  <200 mg/dL Final    HDL 03/21/2022 72  > OR = 50 mg/dL Final    Triglycerides 03/21/2022 94  <150  mg/dL Final    LDL Cholesterol 03/21/2022 170 (H)  mg/dL (calc) Final    Comment: Reference range: <100     Desirable range <100 mg/dL for primary prevention;    <70 mg/dL for patients with CHD or diabetic patients   with > or = 2 CHD risk factors.     LDL-C is now calculated using the Devin   calculation, which is a validated novel method providing   better accuracy than the Friedewald equation in the   estimation of LDL-C.   Be SS et al. KAREN. 2013;310(05): 9991-5598   (http://education.Pedius/faq/PXP143)      HDL/Cholesterol Ratio 03/21/2022 3.7  <5.0 (calc) Final    Non HDL Chol. (LDL+VLDL) 03/21/2022 191 (H)  <130 mg/dL (calc) Final    Comment: For patients with diabetes plus 1 major ASCVD risk   factor, treating to a non-HDL-C goal of <100 mg/dL   (LDL-C of <70 mg/dL) is considered a therapeutic   option.      TSH w/reflex to FT4 03/21/2022 2.48  0.40 - 4.50 mIU/L Final    Color, UA 03/21/2022 YELLOW  YELLOW Final    Appearance, UA 03/21/2022 CLEAR  CLEAR Final    Specific Gravity, UA 03/21/2022 1.028  1.001 - 1.035 Final    pH, UA 03/21/2022 5.5  5.0 - 8.0 Final    Glucose, UA 03/21/2022 NEGATIVE  NEGATIVE Final    Bilirubin, UA 03/21/2022 NEGATIVE  NEGATIVE Final    Ketones, UA 03/21/2022 TRACE (A)  NEGATIVE Final    Occult Blood UA 03/21/2022 NEGATIVE  NEGATIVE Final    Protein, UA 03/21/2022 NEGATIVE  NEGATIVE Final    Nitrite, UA 03/21/2022 POSITIVE (A)  NEGATIVE Final    Leukocytes, UA 03/21/2022 NEGATIVE  NEGATIVE Final    WBC Casts, UA 03/21/2022 NONE SEEN  < OR = 5 /HPF Final    RBC Casts, UA 03/21/2022 NONE SEEN  < OR = 2 /HPF Final    Squam Epithel, UA 03/21/2022 0-5  < OR = 5 /HPF Final    Bacteria, UA 03/21/2022 MANY (A)  NONE SEEN /HPF Final    Hyaline Casts, UA 03/21/2022 NONE SEEN  NONE SEEN /LPF Final    Reflexive Urine Culture 03/21/2022    Final    NO CULTURE INDICATED         Past Medical History:   Diagnosis Date    Arthritis     Cancer 2022    DCIS left breast     Hypertension     Macular degeneration     stable    Scoliosis      Past Surgical History:   Procedure Laterality Date    bilateral cataract surgery      BREAST BIOPSY Bilateral     BREAST CYST ASPIRATION Bilateral      SECTION      MASTECTOMY, PARTIAL Left 2022    Procedure: MASTECTOMY, PARTIAL;  Surgeon: Chanel Calderon MD;  Location: [facility];  Service: General;  Laterality: Left;  LOCALIZER CLIP ON  AT [facility]     Family History   Problem Relation Age of Onset    Cancer Mother     Breast cancer Mother     Breast cancer Maternal Grandmother        Marital Status:   Alcohol History:  reports current alcohol use of about 2.0 - 3.0 standard drinks per week.  Tobacco History:  reports that she has never smoked. She has never used smokeless tobacco.  Drug History:  reports no history of drug use.    Review of patient's allergies indicates:   Allergen Reactions    Cheese      Other reaction(s): Headache   BLUE CHEESE       Current Outpatient Medications:     anastrozole (ARIMIDEX) 1 mg Tab, Take 1 tablet (1 mg total) by mouth once daily., Disp: 30 tablet, Rfl: 6    atorvastatin (LIPITOR) 10 MG tablet, TAKE 1 TABLET BY MOUTH EVERY DAY, Disp: 90 tablet, Rfl: 1    telmisartan-hydrochlorothiazide (MICARDIS HCT) 40-12.5 mg per tablet, TAKE 1 TABLET BY MOUTH EVERY DAY, Disp: 90 tablet, Rfl: 1    vit C/E/Zn/coppr/lutein/zeaxan (OCUVITE LUTEIN AND ZEAXANTHIN ORAL), Take 2 capsules by mouth once daily., Disp: , Rfl:     HYDROcodone-acetaminophen (NORCO) 5-325 mg per tablet, Take 1 tablet by mouth every 8 (eight) hours as needed for Pain., Disp: 18 tablet, Rfl: 0    minocycline (MINOCIN,DYNACIN) 100 MG capsule, Take 100 mg by mouth every 12 (twelve) hours., Disp: , Rfl:     nirmatrelvir-ritonavir 150 mg x 2- 100 mg copackaged tablets (EUA), Take 3 tablets by mouth 2 (two) times daily. Each dose contains 2 nirmatrelvir (pink tablets) and 1 ritonavir (white tablet). Take all 3 tablets together, Disp: 30  "tablet, Rfl: 0    sulfamethoxazole-trimethoprim 800-160mg (BACTRIM DS) 800-160 mg Tab, Take 1 tablet by mouth 2 (two) times daily., Disp: , Rfl:     Review of Systems   Constitutional:  Negative for appetite change, fatigue, fever and unexpected weight change.   Respiratory:  Negative for cough, chest tightness, shortness of breath and wheezing.    Cardiovascular:  Negative for chest pain and leg swelling.   Gastrointestinal:  Negative for abdominal pain, constipation, nausea and vomiting.        -heartburn   Genitourinary:  Negative for difficulty urinating, dysuria, frequency and urgency.   Musculoskeletal:  Negative for arthralgias, back pain, myalgias and neck pain.   Skin:  Negative for rash.   Neurological:  Negative for dizziness, weakness, numbness and headaches.   Hematological:  Does not bruise/bleed easily.   Psychiatric/Behavioral:  Negative for dysphoric mood, sleep disturbance and suicidal ideas. The patient is not nervous/anxious.    All other systems reviewed and are negative.       Objective:      Vitals:    09/30/22 1034 09/30/22 1101   BP: (!) (P) 170/68 (!) 158/70   Pulse: (P) 98    Weight: 89.8 kg (198 lb)    Height: 5' 9" (1.753 m)      Body mass index is 29.24 kg/m².   Wt Readings from Last 3 Encounters:   09/30/22 89.8 kg (198 lb)   08/11/22 91.2 kg (201 lb)   05/24/22 92.5 kg (203 lb 14.8 oz)         Physical Exam  Vitals reviewed.   Constitutional:       General: She is not in acute distress.     Appearance: Normal appearance. She is well-developed.      Comments: overweight   HENT:      Head: Normocephalic and atraumatic.   Neck:      Thyroid: No thyromegaly.   Cardiovascular:      Rate and Rhythm: Normal rate and regular rhythm.      Heart sounds: Normal heart sounds. No murmur heard.    No friction rub.   Pulmonary:      Effort: Pulmonary effort is normal.      Breath sounds: Normal breath sounds. No wheezing or rales.   Abdominal:      General: Bowel sounds are normal. There is no " distension.      Palpations: Abdomen is soft.      Tenderness: There is no abdominal tenderness.   Musculoskeletal:      Cervical back: Neck supple.   Lymphadenopathy:      Cervical: No cervical adenopathy.   Skin:     General: Skin is warm and dry.      Findings: No rash.   Neurological:      Mental Status: She is alert and oriented to person, place, and time.   Psychiatric:         Attention and Perception: She is attentive.         Speech: Speech normal.         Behavior: Behavior normal.         Thought Content: Thought content normal.         Judgment: Judgment normal.         Assessment:       1. Annual physical exam    2. Essential hypertension    3. Mixed hyperlipidemia    4. Ductal carcinoma in situ (DCIS) of left breast    5. Influenza vaccine administered    6. Long-term use of high-risk medication         Plan:       Annual physical exam    Essential hypertension  Comments:  Uncontrolled. Pt to keep BP log at home and submit if BP remains elevated    Mixed hyperlipidemia  Comments:  Optimal. LDL 94. To continue statin    Ductal carcinoma in situ (DCIS) of left breast  Comments:  Active. To continue to follow withOncology    Influenza vaccine administered  -     Influenza - Quadrivalent - High Dose (65+) (PF) (IM)    Long-term use of high-risk medication  -     CBC Auto Differential; Future; Expected date: 09/30/2022    Other  Lab results discussed and reviewed with patient.  Labs have been ordered for monitoring of chronic conditions, just before next visit.    Follow up in about 6 months (around 3/30/2023) for HTN.

## 2022-10-03 NOTE — PROGRESS NOTES
PROGRESS NOTE    Subjective:       Patient ID: Naye Gallegos is a 66 y.o. female.    3/23/2022:  Grouped microcalcification in left breast.     3/30/2022:  Left breast microcalcifications at 12 oclock  indeterm masses at 10 oclock at 5 and 6 cm from nipple.   Mixed echogenicity mass at 10 oclock 2 cm from nipple    4/12/2022:  MRI Breast Impression:   1. 17 x 16 x 10 mm irregular non masslike enhancement at 12:00 o'clock position of left breast 5 cm from the nipple correlates with site of recently identified pleomorphic microcalcifications and is suspicious for malignancy.  Previous recommendation for stereotactic biopsy of the microcalcifications is unchanged.  2. Previous indeterminate hypoechoic masses in left breast near 10 o'clock position correlate with foci of fat necrosis, requiring no additional workup or follow-up.  3. Moderate bilateral background parenchymal enhancement, within numerable cysts throughout bilateral breasts, suggesting fibrocystic change.  Recommendation: Stereotactic biopsy of left breast pleomorphic microcalcifications    4/26/2022:  Needle biopsy:  Left breast 5cm from nipple  DCIS  ER: 75%, GA: 75%,    5/24/2022:  Left breast partial mastectomy:   LEFT BREAST, PARTIAL MASTECTOMY:   - MICROINVASIVE CARCINOMA IN THE BACKGROUND OF EXTENSIVE HIGH AND    INTERMEDIATE NUCLEAR   GRADE DUCTAL CARCINOMA IN SITU, CRIBRIFORM, MICROPAPILLARY AND SOLID    AND COMEDOCARCINOMA.   - SURGICAL MARGINS NEGATIVE FOR INVASIVE CARCINOMA.   - DUCTAL CARCINOMA INVOLVES SUPERIOR AND INFERIOR SURGICAL MARGINS (SEE    CASE SUMMARY     FOR FURTHER SURGICAL MARGIN EVALUATION).   - RADIAL SCLEROSING LESION.   - ORGANIZING PREVIOUS BIOPSY SITE.    Receptors on invasive: ER: 20%, GA 0%-    7/18/2022:  Bilateral Mastectomy:  Left: DCIS in far medial breast.  Margins widely clear.  SLN negative    Right:  Benign.    9/1/2022:  Started Anastrozole.        Plan: single agent AI therapy for five years    Chief Complaint:  No chief complaint on file.  Breast cancer follow up.     History of Present Illness:   Naye Gallegos is a 66 y.o. female who presents for follow up of breast cancer.        Family and Social history reviewed and is unchanged from 8/11/2022      ROS:  Review of Systems   Constitutional:  Negative for appetite change, fever and unexpected weight change.   HENT:  Negative for mouth sores.    Eyes:  Negative for visual disturbance.   Respiratory:  Negative for cough and shortness of breath.    Cardiovascular:  Negative for chest pain and leg swelling.   Gastrointestinal:  Negative for abdominal pain, blood in stool and diarrhea.   Genitourinary:  Negative for frequency and hematuria.   Musculoskeletal:  Negative for back pain.   Skin:  Negative for rash.   Neurological:  Negative for headaches.   Hematological:  Negative for adenopathy.   Psychiatric/Behavioral:  The patient is not nervous/anxious.         Current Outpatient Medications:     anastrozole (ARIMIDEX) 1 mg Tab, Take 1 tablet (1 mg total) by mouth once daily., Disp: 30 tablet, Rfl: 6    atorvastatin (LIPITOR) 10 MG tablet, TAKE 1 TABLET BY MOUTH EVERY DAY, Disp: 90 tablet, Rfl: 1    telmisartan-hydrochlorothiazide (MICARDIS HCT) 40-12.5 mg per tablet, TAKE 1 TABLET BY MOUTH EVERY DAY, Disp: 90 tablet, Rfl: 1    vit C/E/Zn/coppr/lutein/zeaxan (OCUVITE LUTEIN AND ZEAXANTHIN ORAL), Take 2 capsules by mouth once daily., Disp: , Rfl:         Objective:       Physical Examination:     BP (!) 169/74   Pulse 107   Temp 98.3 °F (36.8 °C)   Wt 89.9 kg (198 lb 4.8 oz)   BMI 29.28 kg/m²     Physical Exam  Constitutional:       Appearance: She is well-developed.   HENT:      Head: Normocephalic and atraumatic.      Right Ear: External ear normal.      Left Ear: External ear normal.   Eyes:      Conjunctiva/sclera: Conjunctivae normal.      Pupils: Pupils are equal, round, and reactive to  light.   Neck:      Thyroid: No thyromegaly.      Trachea: No tracheal deviation.   Cardiovascular:      Rate and Rhythm: Normal rate and regular rhythm.      Heart sounds: Normal heart sounds.   Pulmonary:      Effort: Pulmonary effort is normal.      Breath sounds: Normal breath sounds.   Abdominal:      General: Bowel sounds are normal. There is no distension.      Palpations: Abdomen is soft. There is no mass.      Tenderness: There is no abdominal tenderness.   Skin:     Findings: No rash.   Neurological:      Comments: Neuro intact througout   Psychiatric:         Behavior: Behavior normal.         Thought Content: Thought content normal.         Judgment: Judgment normal.       Labs:   Recent Results (from the past 336 hour(s))   CBC Auto Differential    Collection Time: 09/29/22  9:36 AM   Result Value Ref Range    WBC 6.2 3.8 - 10.8 Thousand/uL    Hemoglobin 13.2 11.7 - 15.5 g/dL    Hematocrit 42.0 35.0 - 45.0 %    Platelets 330 140 - 400 Thousand/uL     CMP  Sodium   Date Value Ref Range Status   09/29/2022 139 135 - 146 mmol/L Final     Potassium   Date Value Ref Range Status   09/29/2022 3.6 3.5 - 5.3 mmol/L Final     Chloride   Date Value Ref Range Status   09/29/2022 103 98 - 110 mmol/L Final     CO2   Date Value Ref Range Status   09/29/2022 29 20 - 32 mmol/L Final     Glucose   Date Value Ref Range Status   09/29/2022 97 65 - 99 mg/dL Final     Comment:                   Fasting reference interval          BUN   Date Value Ref Range Status   09/29/2022 17 7 - 25 mg/dL Final     Creatinine   Date Value Ref Range Status   09/29/2022 0.75 0.50 - 1.05 mg/dL Final     Calcium   Date Value Ref Range Status   09/29/2022 9.5 8.6 - 10.4 mg/dL Final     Total Protein   Date Value Ref Range Status   09/29/2022 7.1 6.1 - 8.1 g/dL Final     Albumin   Date Value Ref Range Status   09/29/2022 4.5 3.6 - 5.1 g/dL Final     Total Bilirubin   Date Value Ref Range Status   09/29/2022 0.5 0.2 - 1.2 mg/dL Final      Alkaline Phosphatase   Date Value Ref Range Status   11/21/2019 90 33 - 130 U/L Final     AST   Date Value Ref Range Status   09/29/2022 18 10 - 35 U/L Final     ALT   Date Value Ref Range Status   09/29/2022 26 6 - 29 U/L Final     Anion Gap   Date Value Ref Range Status   05/19/2022 9 8 - 16 mmol/L Final     eGFR if    Date Value Ref Range Status   05/19/2022 >60.0 >60 mL/min/1.73 m^2 Final     eGFR if non    Date Value Ref Range Status   05/19/2022 >60.0 >60 mL/min/1.73 m^2 Final     Comment:     Calculation used to obtain the estimated glomerular filtration  rate (eGFR) is the CKD-EPI equation.        No results found for: CEA  No results found for: PSA        Assessment/Plan:     Problem List Items Addressed This Visit       Microinvasive Left Breast Cancer     Patient is doing well and is tolerating the anastrozole therapy well.  I discussed this with her today and will continue this for five years.  She also discussed today that she has a very significant family history of breast cancer.  I feel a BRCA gene test is needed and will order this now.  Will see her again in four months to see how she is doing on the AI therapy.          Relevant Orders    Comprehensive BRCA 1/2 Analysis    CBC Auto Differential    Comprehensive Metabolic Panel       Discussion:     Follow up in about 4 months (around 2/4/2023).      Electronically signed by Lasha Monzon

## 2022-10-04 ENCOUNTER — OFFICE VISIT (OUTPATIENT)
Dept: HEMATOLOGY/ONCOLOGY | Facility: CLINIC | Age: 67
End: 2022-10-04
Payer: OTHER GOVERNMENT

## 2022-10-04 VITALS
TEMPERATURE: 98 F | HEART RATE: 107 BPM | SYSTOLIC BLOOD PRESSURE: 169 MMHG | WEIGHT: 198.31 LBS | DIASTOLIC BLOOD PRESSURE: 74 MMHG | BODY MASS INDEX: 29.28 KG/M2

## 2022-10-04 DIAGNOSIS — C50.112 MALIGNANT NEOPLASM OF CENTRAL PORTION OF LEFT BREAST IN FEMALE, ESTROGEN RECEPTOR POSITIVE: ICD-10-CM

## 2022-10-04 DIAGNOSIS — Z17.0 MALIGNANT NEOPLASM OF CENTRAL PORTION OF LEFT BREAST IN FEMALE, ESTROGEN RECEPTOR POSITIVE: ICD-10-CM

## 2022-10-04 PROCEDURE — 99213 OFFICE O/P EST LOW 20 MIN: CPT | Mod: S$GLB,,, | Performed by: INTERNAL MEDICINE

## 2022-10-04 PROCEDURE — 99213 PR OFFICE/OUTPT VISIT, EST, LEVL III, 20-29 MIN: ICD-10-PCS | Mod: S$GLB,,, | Performed by: INTERNAL MEDICINE

## 2022-10-04 NOTE — ASSESSMENT & PLAN NOTE
Patient is doing well and is tolerating the anastrozole therapy well.  I discussed this with her today and will continue this for five years.  She also discussed today that she has a very significant family history of breast cancer.  I feel a BRCA gene test is needed and will order this now.  Will see her again in four months to see how she is doing on the AI therapy.

## 2022-10-20 ENCOUNTER — TELEPHONE (OUTPATIENT)
Dept: HEMATOLOGY/ONCOLOGY | Facility: CLINIC | Age: 67
End: 2022-10-20

## 2022-10-21 ENCOUNTER — TELEPHONE (OUTPATIENT)
Dept: HEMATOLOGY/ONCOLOGY | Facility: CLINIC | Age: 67
End: 2022-10-21

## 2023-02-02 ENCOUNTER — OFFICE VISIT (OUTPATIENT)
Dept: HEMATOLOGY/ONCOLOGY | Facility: CLINIC | Age: 68
End: 2023-02-02
Payer: OTHER GOVERNMENT

## 2023-02-02 VITALS
BODY MASS INDEX: 29.03 KG/M2 | RESPIRATION RATE: 20 BRPM | HEIGHT: 69 IN | TEMPERATURE: 98 F | HEART RATE: 87 BPM | SYSTOLIC BLOOD PRESSURE: 167 MMHG | WEIGHT: 196 LBS | DIASTOLIC BLOOD PRESSURE: 70 MMHG

## 2023-02-02 DIAGNOSIS — Z17.0 MALIGNANT NEOPLASM OF CENTRAL PORTION OF LEFT BREAST IN FEMALE, ESTROGEN RECEPTOR POSITIVE: ICD-10-CM

## 2023-02-02 DIAGNOSIS — C50.112 MALIGNANT NEOPLASM OF CENTRAL PORTION OF LEFT BREAST IN FEMALE, ESTROGEN RECEPTOR POSITIVE: ICD-10-CM

## 2023-02-02 PROCEDURE — 99213 OFFICE O/P EST LOW 20 MIN: CPT | Mod: S$GLB,,, | Performed by: INTERNAL MEDICINE

## 2023-02-02 PROCEDURE — 99213 PR OFFICE/OUTPT VISIT, EST, LEVL III, 20-29 MIN: ICD-10-PCS | Mod: S$GLB,,, | Performed by: INTERNAL MEDICINE

## 2023-02-02 NOTE — PROGRESS NOTES
PROGRESS NOTE    Subjective:       Patient ID: Naye Gallegos is a 67 y.o. female.    3/23/2022:  Grouped microcalcification in left breast.     3/30/2022:  Left breast microcalcifications at 12 oclock  indeterm masses at 10 oclock at 5 and 6 cm from nipple.   Mixed echogenicity mass at 10 oclock 2 cm from nipple    4/12/2022:  MRI Breast Impression:   1. 17 x 16 x 10 mm irregular non masslike enhancement at 12:00 o'clock position of left breast 5 cm from the nipple correlates with site of recently identified pleomorphic microcalcifications and is suspicious for malignancy.  Previous recommendation for stereotactic biopsy of the microcalcifications is unchanged.  2. Previous indeterminate hypoechoic masses in left breast near 10 o'clock position correlate with foci of fat necrosis, requiring no additional workup or follow-up.  3. Moderate bilateral background parenchymal enhancement, within numerable cysts throughout bilateral breasts, suggesting fibrocystic change.  Recommendation: Stereotactic biopsy of left breast pleomorphic microcalcifications    4/26/2022:  Needle biopsy:  Left breast 5cm from nipple  DCIS  ER: 75%, NJ: 75%,    5/24/2022:  Left breast partial mastectomy:   LEFT BREAST, PARTIAL MASTECTOMY:   - MICROINVASIVE CARCINOMA IN THE BACKGROUND OF EXTENSIVE HIGH AND    INTERMEDIATE NUCLEAR   GRADE DUCTAL CARCINOMA IN SITU, CRIBRIFORM, MICROPAPILLARY AND SOLID    AND COMEDOCARCINOMA.   - SURGICAL MARGINS NEGATIVE FOR INVASIVE CARCINOMA.   - DUCTAL CARCINOMA INVOLVES SUPERIOR AND INFERIOR SURGICAL MARGINS (SEE    CASE SUMMARY     FOR FURTHER SURGICAL MARGIN EVALUATION).   - RADIAL SCLEROSING LESION.   - ORGANIZING PREVIOUS BIOPSY SITE.    Receptors on invasive: ER: 20%, NJ 0%-    7/18/2022:  Bilateral Mastectomy:  Left: DCIS in far medial breast.  Margins widely clear.  SLN negative  BRCA negative    Right:  Benign.    9/1/2022:  Started  "Anastrozole.     Plan: single agent AI therapy for five years    Chief Complaint:  No chief complaint on file.    Breast cancer follow up.     History of Present Illness:   Naye Gallegos is a 67 y.o. female who presents for follow up of breast cancer.      Patient continues on anastrozole of today, 2/2/2023 and tolerating well.       Family and Social history reviewed and is unchanged from 8/11/2022      ROS:  Review of Systems   Constitutional:  Negative for appetite change, fever and unexpected weight change.   HENT:  Negative for mouth sores.    Eyes:  Negative for visual disturbance.   Respiratory:  Negative for cough and shortness of breath.    Cardiovascular:  Negative for chest pain and leg swelling.   Gastrointestinal:  Negative for abdominal pain, blood in stool and diarrhea.   Genitourinary:  Negative for frequency and hematuria.   Musculoskeletal:  Negative for back pain.   Skin:  Negative for rash.   Neurological:  Negative for headaches.   Hematological:  Negative for adenopathy.   Psychiatric/Behavioral:  The patient is not nervous/anxious.         Current Outpatient Medications:     anastrozole (ARIMIDEX) 1 mg Tab, Take 1 tablet (1 mg total) by mouth once daily., Disp: 30 tablet, Rfl: 6    atorvastatin (LIPITOR) 10 MG tablet, TAKE 1 TABLET BY MOUTH EVERY DAY, Disp: 90 tablet, Rfl: 1    telmisartan-hydrochlorothiazide (MICARDIS HCT) 40-12.5 mg per tablet, TAKE 1 TABLET BY MOUTH EVERY DAY, Disp: 90 tablet, Rfl: 1    vit C/E/Zn/coppr/lutein/zeaxan (OCUVITE LUTEIN AND ZEAXANTHIN ORAL), Take 2 capsules by mouth once daily., Disp: , Rfl:         Objective:       Physical Examination:     BP (!) 167/70   Pulse 87   Temp 98 °F (36.7 °C)   Resp 20   Ht 5' 9" (1.753 m)   Wt 88.9 kg (196 lb)   BMI 28.94 kg/m²     Physical Exam  Constitutional:       Appearance: She is well-developed.   HENT:      Head: Normocephalic and atraumatic.      Right Ear: External ear normal.      Left Ear: External ear " normal.   Eyes:      Conjunctiva/sclera: Conjunctivae normal.      Pupils: Pupils are equal, round, and reactive to light.   Neck:      Thyroid: No thyromegaly.      Trachea: No tracheal deviation.   Cardiovascular:      Rate and Rhythm: Normal rate and regular rhythm.      Heart sounds: Normal heart sounds.   Pulmonary:      Effort: Pulmonary effort is normal.      Breath sounds: Normal breath sounds.   Chest:       Abdominal:      General: Bowel sounds are normal. There is no distension.      Palpations: Abdomen is soft. There is no mass.      Tenderness: There is no abdominal tenderness.   Skin:     Findings: No rash.   Neurological:      Comments: Neuro intact througout   Psychiatric:         Behavior: Behavior normal.         Thought Content: Thought content normal.         Judgment: Judgment normal.       Labs:   No results found for this or any previous visit (from the past 336 hour(s)).    CMP  Sodium   Date Value Ref Range Status   09/29/2022 139 135 - 146 mmol/L Final     Potassium   Date Value Ref Range Status   09/29/2022 3.6 3.5 - 5.3 mmol/L Final     Chloride   Date Value Ref Range Status   09/29/2022 103 98 - 110 mmol/L Final     CO2   Date Value Ref Range Status   09/29/2022 29 20 - 32 mmol/L Final     Glucose   Date Value Ref Range Status   09/29/2022 97 65 - 99 mg/dL Final     Comment:                   Fasting reference interval          BUN   Date Value Ref Range Status   09/29/2022 17 7 - 25 mg/dL Final     Creatinine   Date Value Ref Range Status   09/29/2022 0.75 0.50 - 1.05 mg/dL Final     Calcium   Date Value Ref Range Status   09/29/2022 9.5 8.6 - 10.4 mg/dL Final     Total Protein   Date Value Ref Range Status   09/29/2022 7.1 6.1 - 8.1 g/dL Final     Albumin   Date Value Ref Range Status   09/29/2022 4.5 3.6 - 5.1 g/dL Final     Total Bilirubin   Date Value Ref Range Status   09/29/2022 0.5 0.2 - 1.2 mg/dL Final     Alkaline Phosphatase   Date Value Ref Range Status   11/21/2019 90 33 -  130 U/L Final     AST   Date Value Ref Range Status   09/29/2022 18 10 - 35 U/L Final     ALT   Date Value Ref Range Status   09/29/2022 26 6 - 29 U/L Final     Anion Gap   Date Value Ref Range Status   05/19/2022 9 8 - 16 mmol/L Final     eGFR if    Date Value Ref Range Status   05/19/2022 >60.0 >60 mL/min/1.73 m^2 Final     eGFR if non    Date Value Ref Range Status   05/19/2022 >60.0 >60 mL/min/1.73 m^2 Final     Comment:     Calculation used to obtain the estimated glomerular filtration  rate (eGFR) is the CKD-EPI equation.        No results found for: CEA  No results found for: PSA        Assessment/Plan:     Problem List Items Addressed This Visit       Microinvasive Left Breast Cancer     Patient is doing well and is on Anastrozole  And doing well with this.  Her labs in September 2022 looked good and will check this again in six months.  We discussed follow up today and will have her back with me in 3 months.  Will arrange 6 month follow up after that visit, provided she is doing well and will try to alternate with surgery follow up which is occurring every six months as well.     Discussed this today.            Discussion:     Follow up in about 3 months (around 5/2/2023).      Electronically signed by Lasha Monzon

## 2023-03-20 DIAGNOSIS — E78.2 MIXED HYPERLIPIDEMIA: ICD-10-CM

## 2023-03-20 DIAGNOSIS — I10 ESSENTIAL HYPERTENSION: ICD-10-CM

## 2023-03-20 RX ORDER — ATORVASTATIN CALCIUM 10 MG/1
10 TABLET, FILM COATED ORAL DAILY
Qty: 90 TABLET | Refills: 3 | Status: SHIPPED | OUTPATIENT
Start: 2023-03-20 | End: 2023-04-17 | Stop reason: SDUPTHER

## 2023-03-20 RX ORDER — TELMISARTAN AND HYDROCHLORTHIAZIDE 40; 12.5 MG/1; MG/1
1 TABLET ORAL DAILY
Qty: 90 TABLET | Refills: 3 | Status: SHIPPED | OUTPATIENT
Start: 2023-03-20 | End: 2023-11-08 | Stop reason: SDUPTHER

## 2023-03-20 NOTE — TELEPHONE ENCOUNTER
----- Message from Frances Velazquez sent at 3/20/2023  3:44 PM CDT -----  Vm- pt needs refill on her 2 rx's   [facility] elayne   [phone]

## 2023-03-20 NOTE — TELEPHONE ENCOUNTER
This prescription has been approved and sent to   [facility]/pharmacy #[id number] - [place] - [address]  [address] [id number]  Phone: [phone] Fax: [phone]

## 2023-03-30 ENCOUNTER — TELEPHONE (OUTPATIENT)
Dept: FAMILY MEDICINE | Facility: CLINIC | Age: 68
End: 2023-03-30

## 2023-04-17 ENCOUNTER — OFFICE VISIT (OUTPATIENT)
Dept: FAMILY MEDICINE | Facility: CLINIC | Age: 68
End: 2023-04-17
Payer: OTHER GOVERNMENT

## 2023-04-17 VITALS
WEIGHT: 200 LBS | OXYGEN SATURATION: 97 % | SYSTOLIC BLOOD PRESSURE: 128 MMHG | BODY MASS INDEX: 29.62 KG/M2 | HEART RATE: 102 BPM | DIASTOLIC BLOOD PRESSURE: 80 MMHG | HEIGHT: 69 IN

## 2023-04-17 DIAGNOSIS — R06.02 SOB (SHORTNESS OF BREATH): ICD-10-CM

## 2023-04-17 DIAGNOSIS — I10 ESSENTIAL HYPERTENSION: Primary | ICD-10-CM

## 2023-04-17 DIAGNOSIS — Z79.899 LONG-TERM USE OF HIGH-RISK MEDICATION: ICD-10-CM

## 2023-04-17 DIAGNOSIS — Z23 PNEUMOCOCCAL VACCINE ADMINISTERED: ICD-10-CM

## 2023-04-17 DIAGNOSIS — Z76.0 MEDICATION REFILL: ICD-10-CM

## 2023-04-17 PROCEDURE — 90471 PNEUMOCOCCAL CONJUGATE VACCINE 20-VALENT: ICD-10-PCS | Mod: S$GLB,,, | Performed by: FAMILY MEDICINE

## 2023-04-17 PROCEDURE — 90677 PNEUMOCOCCAL CONJUGATE VACCINE 20-VALENT: ICD-10-PCS | Mod: S$GLB,,, | Performed by: FAMILY MEDICINE

## 2023-04-17 PROCEDURE — 90677 PCV20 VACCINE IM: CPT | Mod: S$GLB,,, | Performed by: FAMILY MEDICINE

## 2023-04-17 PROCEDURE — 90471 IMMUNIZATION ADMIN: CPT | Mod: S$GLB,,, | Performed by: FAMILY MEDICINE

## 2023-04-17 PROCEDURE — 99214 PR OFFICE/OUTPT VISIT, EST, LEVL IV, 30-39 MIN: ICD-10-PCS | Mod: 25,S$GLB,, | Performed by: FAMILY MEDICINE

## 2023-04-17 PROCEDURE — 99214 OFFICE O/P EST MOD 30 MIN: CPT | Mod: 25,S$GLB,, | Performed by: FAMILY MEDICINE

## 2023-04-17 RX ORDER — ATORVASTATIN CALCIUM 10 MG/1
10 TABLET, FILM COATED ORAL DAILY
Qty: 90 TABLET | Refills: 3 | Status: SHIPPED | OUTPATIENT
Start: 2023-04-17 | End: 2023-11-08 | Stop reason: SDUPTHER

## 2023-04-17 NOTE — PROGRESS NOTES
SUBJECTIVE:    Patient ID: Naye Gallegos is a 67 y.o. female.    Chief Complaint: Follow-up (Meds verbally confirmed/ posr nasal drip/ coughing/would like 2nd pna dose//dp)      Pt here to checkup on acute and chronic conditions.    Since last visit, pt has had breast cancer (3/2022), mastectomy, and restorative breast surgery. Pt did not have to do any chemo or radiation.  She is on anastrozole. ([facility]). Next appt is next month.     BP is doing ok today.  Had some caffeine today. Has mild sob.    Saw Dr. Loredo (Derm) last week for full body check    Walks for exercise 2-4 miles most days of the week.     No recent labs, though has some coming up.     Has been having some issues with allergies, not taking anything otc.   -------------------------------------------------------------------------------  Pap by Dr. ELEN Lopez, due in May.   Has a hx macular degeneration.  Seeing Dr. Bhardwaj. Next appt in June.  Taking AREDS 2.  Has not been taking anything for her bones, Nl        No visits with results within 7 Month(s) from this visit.   Latest known visit with results is:   Lab Visit on 05/20/2022   Component Date Value Ref Range Status    Specimen UA 05/20/2022 Urine, Clean Catch   Final    Color, UA 05/20/2022 Yellow  Yellow, Straw, Mag Final    Appearance, UA 05/20/2022 Clear  Clear Final    pH, UA 05/20/2022 6.0  5.0 - 8.0 Final    Specific Gravity, UA 05/20/2022 1.015  1.005 - 1.030 Final    Protein, UA 05/20/2022 Negative  Negative Final    Comment: Recommend a 24 hour urine protein or a urine   protein/creatinine ratio if globulin induced proteinuria is  clinically suspected.      Glucose, UA 05/20/2022 Negative  Negative Final    Ketones, UA 05/20/2022 Negative  Negative Final    Bilirubin (UA) 05/20/2022 Negative  Negative Final    Occult Blood UA 05/20/2022 Negative  Negative Final    Nitrite, UA 05/20/2022 Negative  Negative Final    Urobilinogen, UA 05/20/2022 Negative  Negative EU/dL Final     Leukocytes, UA 2022 Negative  Negative Final         Past Medical History:   Diagnosis Date    Arthritis     Cancer     DCIS left breast    Hypertension     Macular degeneration     stable    Scoliosis      Past Surgical History:   Procedure Laterality Date    bilateral cataract surgery      BREAST BIOPSY Bilateral     BREAST CYST ASPIRATION Bilateral      SECTION      MASTECTOMY, PARTIAL Left 2022    Procedure: MASTECTOMY, PARTIAL;  Surgeon: Chanel Calderon MD;  Location: [facility];  Service: General;  Laterality: Left;  LOCALIZER CLIP ON  AT [facility]     Family History   Problem Relation Age of Onset    Cancer Mother     Breast cancer Mother     Breast cancer Maternal Grandmother        Marital Status:   Alcohol History:  reports current alcohol use of about 2.0 - 3.0 standard drinks per week.  Tobacco History:  reports that she has never smoked. She has never used smokeless tobacco.  Drug History:  reports no history of drug use.    Review of patient's allergies indicates:   Allergen Reactions    Cheese      Other reaction(s): Headache   BLUE CHEESE       Current Outpatient Medications:     anastrozole (ARIMIDEX) 1 mg Tab, TAKE 1 TABLET BY MOUTH EVERY DAY, Disp: 30 tablet, Rfl: 2    telmisartan-hydrochlorothiazide (MICARDIS HCT) 40-12.5 mg per tablet, Take 1 tablet by mouth once daily., Disp: 90 tablet, Rfl: 3    vit C/E/Zn/coppr/lutein/zeaxan (OCUVITE LUTEIN AND ZEAXANTHIN ORAL), Take 2 capsules by mouth once daily., Disp: , Rfl:     atorvastatin (LIPITOR) 10 MG tablet, Take 1 tablet (10 mg total) by mouth once daily., Disp: 90 tablet, Rfl: 3    Review of Systems   Constitutional:  Negative for appetite change, fatigue, fever and unexpected weight change.   Respiratory:  Positive for shortness of breath. Negative for cough, chest tightness and wheezing.    Cardiovascular:  Negative for chest pain and leg swelling.   Gastrointestinal:  Negative for abdominal pain, constipation,  "nausea and vomiting.        -heartburn   Genitourinary:  Negative for difficulty urinating, dysuria, frequency and urgency.   Musculoskeletal:  Negative for arthralgias, back pain, myalgias and neck pain.   Skin:  Negative for rash.   Neurological:  Negative for dizziness, weakness, numbness and headaches.   Hematological:  Does not bruise/bleed easily.   Psychiatric/Behavioral:  Negative for dysphoric mood, sleep disturbance and suicidal ideas. The patient is not nervous/anxious.    All other systems reviewed and are negative.       Objective:      Vitals:    04/17/23 1036   BP: 128/80   Pulse: 102   SpO2: 97%   Weight: 90.7 kg (200 lb)   Height: 5' 9" (1.753 m)       Body mass index is 29.53 kg/m².   Wt Readings from Last 3 Encounters:   04/17/23 90.7 kg (200 lb)   02/02/23 88.9 kg (196 lb)   10/04/22 89.9 kg (198 lb 4.8 oz)         Physical Exam  Vitals reviewed.   Constitutional:       General: She is not in acute distress.     Appearance: Normal appearance. She is well-developed.      Comments: overweight   HENT:      Head: Normocephalic and atraumatic.   Neck:      Thyroid: No thyromegaly.   Cardiovascular:      Rate and Rhythm: Normal rate and regular rhythm.      Heart sounds: Normal heart sounds. No murmur heard.    No friction rub.   Pulmonary:      Effort: Pulmonary effort is normal.      Breath sounds: Normal breath sounds. No wheezing or rales.   Abdominal:      General: Bowel sounds are normal. There is no distension.      Palpations: Abdomen is soft.      Tenderness: There is no abdominal tenderness.   Musculoskeletal:      Cervical back: Neck supple.   Lymphadenopathy:      Cervical: No cervical adenopathy.   Skin:     General: Skin is warm and dry.      Findings: No rash.   Neurological:      Mental Status: She is alert and oriented to person, place, and time.   Psychiatric:         Attention and Perception: She is attentive.         Speech: Speech normal.         Behavior: Behavior normal.         " Thought Content: Thought content normal.         Judgment: Judgment normal.         Assessment:       1. Essential hypertension    2. SOB (shortness of breath)    3. Pneumococcal vaccine administered    4. Medication refill    5. Long-term use of high-risk medication           Plan:       Essential hypertension  Comments:  Controlled. Will continue to monitor BP on current medication regimen  Orders:  -     Lipid Panel; Future; Expected date: 04/17/2023  -     TSH w/reflex to FT4; Future; Expected date: 04/17/2023  -     Microalbumin/Creatinine Ratio, Urine; Future; Expected date: 04/17/2023  -     Urinalysis, Reflex to Urine Culture Urine, Clean Catch; Future; Expected date: 04/17/2023  -     EKG 12-lead; Future  -     Echo Saline Bubble? No; Future    SOB (shortness of breath)  Comments:  Will get EKG and echo    Orders:  -     EKG 12-lead; Future  -     Echo Saline Bubble? No; Future    Pneumococcal vaccine administered  -     (In Office Administered) Pneumococcal Conjugate Vaccine (20 Valent) (IM)    Medication refill  -     atorvastatin (LIPITOR) 10 MG tablet; Take 1 tablet (10 mg total) by mouth once daily.  Dispense: 90 tablet; Refill: 3    Long-term use of high-risk medication  -     Lipid Panel; Future; Expected date: 04/17/2023  -     TSH w/reflex to FT4; Future; Expected date: 04/17/2023  -     Microalbumin/Creatinine Ratio, Urine; Future; Expected date: 04/17/2023  -     Urinalysis, Reflex to Urine Culture Urine, Clean Catch; Future; Expected date: 04/17/2023  -     Echo Saline Bubble? No; Future  -     Urinalysis, Reflex to Urine Culture Urine, Clean Catch; Future; Expected date: 04/17/2023  -     Microalbumin/Creatinine Ratio, Urine; Future; Expected date: 04/17/2023  -     Lipid Panel; Future; Expected date: 04/17/2023  -     TSH; Future; Expected date: 04/17/2023      Labs and/or tests have been ordered for the evaluation/monitoring of acute/chronic conditions, to be done just before next  visit.    Follow up in about 6 months (around 10/17/2023) for HTN, LABS.

## 2023-04-18 ENCOUNTER — LAB VISIT (OUTPATIENT)
Dept: LAB | Facility: HOSPITAL | Age: 68
End: 2023-04-18
Attending: FAMILY MEDICINE
Payer: OTHER GOVERNMENT

## 2023-04-18 DIAGNOSIS — Z79.899 LONG-TERM USE OF HIGH-RISK MEDICATION: ICD-10-CM

## 2023-04-18 LAB
ALBUMIN/CREAT UR: 1.8 UG/MG (ref 0–30)
BILIRUB UR QL STRIP: NEGATIVE
CHOLEST SERPL-MCNC: 184 MG/DL (ref 120–199)
CHOLEST/HDLC SERPL: 2.7 {RATIO} (ref 2–5)
CLARITY UR: CLEAR
COLOR UR: YELLOW
CREAT UR-MCNC: 112 MG/DL (ref 15–325)
GLUCOSE UR QL STRIP: NEGATIVE
HDLC SERPL-MCNC: 69 MG/DL (ref 40–75)
HDLC SERPL: 37.5 % (ref 20–50)
HGB UR QL STRIP: NEGATIVE
KETONES UR QL STRIP: NEGATIVE
LDLC SERPL CALC-MCNC: 99.6 MG/DL (ref 63–159)
LEUKOCYTE ESTERASE UR QL STRIP: NEGATIVE
MICROALBUMIN UR DL<=1MG/L-MCNC: 2 UG/ML
NITRITE UR QL STRIP: NEGATIVE
NONHDLC SERPL-MCNC: 115 MG/DL
PH UR STRIP: 7 [PH] (ref 5–8)
PROT UR QL STRIP: NEGATIVE
SP GR UR STRIP: 1.02 (ref 1–1.03)
TRIGL SERPL-MCNC: 77 MG/DL (ref 30–150)
TSH SERPL DL<=0.005 MIU/L-ACNC: 2.83 UIU/ML (ref 0.34–5.6)
URN SPEC COLLECT METH UR: NORMAL
UROBILINOGEN UR STRIP-ACNC: NEGATIVE EU/DL

## 2023-04-18 PROCEDURE — 82043 UR ALBUMIN QUANTITATIVE: CPT | Performed by: FAMILY MEDICINE

## 2023-04-18 PROCEDURE — 80061 LIPID PANEL: CPT | Performed by: FAMILY MEDICINE

## 2023-04-18 PROCEDURE — 84443 ASSAY THYROID STIM HORMONE: CPT | Performed by: FAMILY MEDICINE

## 2023-04-18 PROCEDURE — 36415 COLL VENOUS BLD VENIPUNCTURE: CPT | Performed by: FAMILY MEDICINE

## 2023-04-18 PROCEDURE — 81003 URINALYSIS AUTO W/O SCOPE: CPT | Performed by: FAMILY MEDICINE

## 2023-05-09 ENCOUNTER — OFFICE VISIT (OUTPATIENT)
Dept: HEMATOLOGY/ONCOLOGY | Facility: CLINIC | Age: 68
End: 2023-05-09
Payer: OTHER GOVERNMENT

## 2023-05-09 VITALS
HEART RATE: 95 BPM | TEMPERATURE: 98 F | WEIGHT: 201 LBS | HEIGHT: 69 IN | SYSTOLIC BLOOD PRESSURE: 149 MMHG | DIASTOLIC BLOOD PRESSURE: 69 MMHG | RESPIRATION RATE: 20 BRPM | BODY MASS INDEX: 29.77 KG/M2

## 2023-05-09 DIAGNOSIS — R74.8 ELEVATED LIVER ENZYMES: ICD-10-CM

## 2023-05-09 DIAGNOSIS — C50.112 MALIGNANT NEOPLASM OF CENTRAL PORTION OF LEFT BREAST IN FEMALE, ESTROGEN RECEPTOR POSITIVE: ICD-10-CM

## 2023-05-09 DIAGNOSIS — Z17.0 MALIGNANT NEOPLASM OF CENTRAL PORTION OF LEFT BREAST IN FEMALE, ESTROGEN RECEPTOR POSITIVE: ICD-10-CM

## 2023-05-09 PROCEDURE — 99213 PR OFFICE/OUTPT VISIT, EST, LEVL III, 20-29 MIN: ICD-10-PCS | Mod: S$GLB,,, | Performed by: INTERNAL MEDICINE

## 2023-05-09 PROCEDURE — 99213 OFFICE O/P EST LOW 20 MIN: CPT | Mod: S$GLB,,, | Performed by: INTERNAL MEDICINE

## 2023-05-09 RX ORDER — FERROUS SULFATE, DRIED 160(50) MG
1 TABLET, EXTENDED RELEASE ORAL 2 TIMES DAILY WITH MEALS
COMMUNITY

## 2023-05-09 NOTE — PROGRESS NOTES
PROGRESS NOTE    Subjective:       Patient ID: Naye Gallegos is a 67 y.o. female.    3/23/2022:  Grouped microcalcification in left breast.     3/30/2022:  Left breast microcalcifications at 12 oclock  indeterm masses at 10 oclock at 5 and 6 cm from nipple.   Mixed echogenicity mass at 10 oclock 2 cm from nipple    4/12/2022:  MRI Breast Impression:   1. 17 x 16 x 10 mm irregular non masslike enhancement at 12:00 o'clock position of left breast 5 cm from the nipple correlates with site of recently identified pleomorphic microcalcifications and is suspicious for malignancy.  Previous recommendation for stereotactic biopsy of the microcalcifications is unchanged.  2. Previous indeterminate hypoechoic masses in left breast near 10 o'clock position correlate with foci of fat necrosis, requiring no additional workup or follow-up.  3. Moderate bilateral background parenchymal enhancement, within numerable cysts throughout bilateral breasts, suggesting fibrocystic change.  Recommendation: Stereotactic biopsy of left breast pleomorphic microcalcifications    4/26/2022:  Needle biopsy:  Left breast 5cm from nipple  DCIS  ER: 75%, MD: 75%,    5/24/2022:  Left breast partial mastectomy:   LEFT BREAST, PARTIAL MASTECTOMY:   - MICROINVASIVE CARCINOMA IN THE BACKGROUND OF EXTENSIVE HIGH AND    INTERMEDIATE NUCLEAR   GRADE DUCTAL CARCINOMA IN SITU, CRIBRIFORM, MICROPAPILLARY AND SOLID    AND COMEDOCARCINOMA.   - SURGICAL MARGINS NEGATIVE FOR INVASIVE CARCINOMA.   - DUCTAL CARCINOMA INVOLVES SUPERIOR AND INFERIOR SURGICAL MARGINS (SEE    CASE SUMMARY     FOR FURTHER SURGICAL MARGIN EVALUATION).   - RADIAL SCLEROSING LESION.   - ORGANIZING PREVIOUS BIOPSY SITE.    Receptors on invasive: ER: 20%, MD 0%-    7/18/2022:  Bilateral Mastectomy:  Left: DCIS in far medial breast.  Margins widely clear.  SLN negative  BRCA negative    Right:  Benign.    9/1/2022:  Started  "Anastrozole.     Plan: single agent AI therapy for five years    Chief Complaint:  No chief complaint on file.    Breast cancer follow up.     History of Present Illness:   Naye Gallegos is a 67 y.o. female who presents for follow up of breast cancer.      Patient continues on anastrozole of today, 2/2/2023 and tolerating well.       Family and Social history reviewed and is unchanged from 8/11/2022      ROS:  Review of Systems   Constitutional:  Negative for appetite change, fever and unexpected weight change.   HENT:  Negative for mouth sores.    Eyes:  Negative for visual disturbance.   Respiratory:  Negative for cough and shortness of breath.    Cardiovascular:  Negative for chest pain and leg swelling.   Gastrointestinal:  Negative for abdominal pain, blood in stool and diarrhea.   Genitourinary:  Negative for frequency and hematuria.   Musculoskeletal:  Negative for back pain.   Skin:  Negative for rash.   Neurological:  Negative for headaches.   Hematological:  Negative for adenopathy.   Psychiatric/Behavioral:  The patient is not nervous/anxious.         Current Outpatient Medications:     anastrozole (ARIMIDEX) 1 mg Tab, TAKE 1 TABLET BY MOUTH EVERY DAY, Disp: 30 tablet, Rfl: 2    atorvastatin (LIPITOR) 10 MG tablet, Take 1 tablet (10 mg total) by mouth once daily., Disp: 90 tablet, Rfl: 3    calcium-vitamin D3 (CALCIUM 500 + D) 500 mg-5 mcg (200 unit) per tablet, Take 1 tablet by mouth 2 (two) times daily with meals., Disp: , Rfl:     telmisartan-hydrochlorothiazide (MICARDIS HCT) 40-12.5 mg per tablet, Take 1 tablet by mouth once daily., Disp: 90 tablet, Rfl: 3    vit C/E/Zn/coppr/lutein/zeaxan (OCUVITE LUTEIN AND ZEAXANTHIN ORAL), Take 2 capsules by mouth once daily., Disp: , Rfl:         Objective:       Physical Examination:     BP (!) 149/69   Pulse 95   Temp 98.3 °F (36.8 °C)   Resp 20   Ht 5' 9" (1.753 m)   Wt 91.2 kg (201 lb)   BMI 29.68 kg/m²     Physical Exam  Constitutional:       " Appearance: She is well-developed.   HENT:      Head: Normocephalic and atraumatic.      Right Ear: External ear normal.      Left Ear: External ear normal.   Eyes:      Conjunctiva/sclera: Conjunctivae normal.      Pupils: Pupils are equal, round, and reactive to light.   Neck:      Thyroid: No thyromegaly.      Trachea: No tracheal deviation.   Cardiovascular:      Rate and Rhythm: Normal rate and regular rhythm.      Heart sounds: Normal heart sounds.   Pulmonary:      Effort: Pulmonary effort is normal.      Breath sounds: Normal breath sounds.   Abdominal:      General: Bowel sounds are normal. There is no distension.      Palpations: Abdomen is soft. There is no mass.      Tenderness: There is no abdominal tenderness.   Skin:     Findings: No rash.   Neurological:      Comments: Neuro intact througout   Psychiatric:         Behavior: Behavior normal.         Thought Content: Thought content normal.         Judgment: Judgment normal.       Labs:   No results found for this or any previous visit (from the past 336 hour(s)).    CMP  Sodium   Date Value Ref Range Status   04/18/2023 140 136 - 145 mmol/L Final     Potassium   Date Value Ref Range Status   04/18/2023 3.7 3.5 - 5.1 mmol/L Final     Chloride   Date Value Ref Range Status   04/18/2023 103 95 - 110 mmol/L Final     CO2   Date Value Ref Range Status   04/18/2023 27 23 - 29 mmol/L Final     Glucose   Date Value Ref Range Status   04/18/2023 110 70 - 110 mg/dL Final     BUN   Date Value Ref Range Status   04/18/2023 18 8 - 23 mg/dL Final     Creatinine   Date Value Ref Range Status   04/18/2023 0.7 0.5 - 1.4 mg/dL Final     Calcium   Date Value Ref Range Status   04/18/2023 9.7 8.7 - 10.5 mg/dL Final     Total Protein   Date Value Ref Range Status   04/18/2023 7.5 6.0 - 8.4 g/dL Final     Albumin   Date Value Ref Range Status   04/18/2023 4.3 3.5 - 5.2 g/dL Final     Total Bilirubin   Date Value Ref Range Status   04/18/2023 0.7 0.1 - 1.0 mg/dL Final      Comment:     For infants and newborns, interpretation of results should be based  on gestational age, weight and in agreement with clinical  observations.    Premature Infant recommended reference ranges:  Up to 24 hours.............<8.0 mg/dL  Up to 48 hours............<12.0 mg/dL  3-5 days..................<15.0 mg/dL  6-29 days.................<15.0 mg/dL       Alkaline Phosphatase   Date Value Ref Range Status   04/18/2023 249 (H) 55 - 135 U/L Final     AST   Date Value Ref Range Status   04/18/2023 49 (H) 10 - 40 U/L Final     ALT   Date Value Ref Range Status   04/18/2023 126 (H) 10 - 44 U/L Final     Anion Gap   Date Value Ref Range Status   04/18/2023 10 8 - 16 mmol/L Final     eGFR if    Date Value Ref Range Status   05/19/2022 >60.0 >60 mL/min/1.73 m^2 Final     eGFR if non    Date Value Ref Range Status   05/19/2022 >60.0 >60 mL/min/1.73 m^2 Final     Comment:     Calculation used to obtain the estimated glomerular filtration  rate (eGFR) is the CKD-EPI equation.        No results found for: CEA  No results found for: PSA        Assessment/Plan:     Problem List Items Addressed This Visit       Microinvasive Left Breast Cancer     Patient is on anastrole and tolerating this well but note is made of elevated LFTs which has gone up since September when she started this medication.  Will hold this and check her again in six weeks with CMP.  If normal then need to decide on need of this medication.  She is on Caltrate and will continue this at 2 tabs daily for now.  May hold this if LFTs still up on next check.             Elevated liver enzymes     See above           Relevant Orders    Comprehensive Metabolic Panel     Discussion:     Follow up in about 4 weeks (around 6/6/2023).      Electronically signed by Lasha Monzon

## 2023-05-09 NOTE — ASSESSMENT & PLAN NOTE
Patient is on anastrole and tolerating this well but note is made of elevated LFTs which has gone up since September when she started this medication.  Will hold this and check her again in six weeks with CMP.  If normal then need to decide on need of this medication.  She is on Caltrate and will continue this at 2 tabs daily for now.  May hold this if LFTs still up on next check.

## 2023-06-01 ENCOUNTER — HOSPITAL ENCOUNTER (OUTPATIENT)
Dept: CARDIOLOGY | Facility: HOSPITAL | Age: 68
Discharge: HOME | End: 2023-06-01
Attending: FAMILY MEDICINE
Payer: OTHER GOVERNMENT

## 2023-06-01 ENCOUNTER — HOSPITAL ENCOUNTER (OUTPATIENT)
Dept: PREADMISSION TESTING | Facility: HOSPITAL | Age: 68
Discharge: HOME | End: 2023-06-01
Attending: FAMILY MEDICINE
Payer: OTHER GOVERNMENT

## 2023-06-01 VITALS — BODY MASS INDEX: 29.77 KG/M2 | WEIGHT: 201 LBS | HEIGHT: 69 IN

## 2023-06-01 DIAGNOSIS — R06.02 SOB (SHORTNESS OF BREATH): ICD-10-CM

## 2023-06-01 DIAGNOSIS — I10 ESSENTIAL HYPERTENSION: ICD-10-CM

## 2023-06-01 DIAGNOSIS — Z79.899 LONG-TERM USE OF HIGH-RISK MEDICATION: ICD-10-CM

## 2023-06-01 PROCEDURE — 93005 ELECTROCARDIOGRAM TRACING: CPT | Performed by: INTERNAL MEDICINE

## 2023-06-01 PROCEDURE — 93010 EKG 12-LEAD: ICD-10-PCS | Mod: ,,, | Performed by: INTERNAL MEDICINE

## 2023-06-01 PROCEDURE — 93306 TTE W/DOPPLER COMPLETE: CPT

## 2023-06-01 PROCEDURE — 93306 ECHO (CUPID ONLY): ICD-10-PCS | Mod: 26,,, | Performed by: INTERNAL MEDICINE

## 2023-06-01 PROCEDURE — 93306 TTE W/DOPPLER COMPLETE: CPT | Mod: 26,,, | Performed by: INTERNAL MEDICINE

## 2023-06-01 PROCEDURE — 93010 ELECTROCARDIOGRAM REPORT: CPT | Mod: ,,, | Performed by: INTERNAL MEDICINE

## 2023-06-07 ENCOUNTER — TELEPHONE (OUTPATIENT)
Dept: HEMATOLOGY/ONCOLOGY | Facility: CLINIC | Age: 68
End: 2023-06-07

## 2023-06-07 NOTE — TELEPHONE ENCOUNTER
Called patient for upcoming appointment and laboratory work 06/13/2023, patient stated understanding.

## 2023-06-12 ENCOUNTER — LAB VISIT (OUTPATIENT)
Dept: LAB | Facility: HOSPITAL | Age: 68
End: 2023-06-12
Attending: INTERNAL MEDICINE
Payer: OTHER GOVERNMENT

## 2023-06-12 DIAGNOSIS — R74.8 ELEVATED LIVER ENZYMES: ICD-10-CM

## 2023-06-12 LAB
ALBUMIN SERPL BCP-MCNC: 4.5 G/DL (ref 3.5–5.2)
ALP SERPL-CCNC: 109 U/L (ref 55–135)
ALT SERPL W/O P-5'-P-CCNC: 34 U/L (ref 10–44)
ANION GAP SERPL CALC-SCNC: 6 MMOL/L (ref 8–16)
AST SERPL-CCNC: 24 U/L (ref 10–40)
BILIRUB SERPL-MCNC: 0.8 MG/DL (ref 0.1–1)
BUN SERPL-MCNC: 15 MG/DL (ref 8–23)
CALCIUM SERPL-MCNC: 9.1 MG/DL (ref 8.7–10.5)
CHLORIDE SERPL-SCNC: 103 MMOL/L (ref 95–110)
CO2 SERPL-SCNC: 28 MMOL/L (ref 23–29)
CREAT SERPL-MCNC: 0.9 MG/DL (ref 0.5–1.4)
EST. GFR  (NO RACE VARIABLE): >60 ML/MIN/1.73 M^2
GLUCOSE SERPL-MCNC: 109 MG/DL (ref 70–110)
POTASSIUM SERPL-SCNC: 3.5 MMOL/L (ref 3.5–5.1)
PROT SERPL-MCNC: 7.2 G/DL (ref 6–8.4)
SODIUM SERPL-SCNC: 137 MMOL/L (ref 136–145)

## 2023-06-12 PROCEDURE — 80053 COMPREHEN METABOLIC PANEL: CPT | Performed by: INTERNAL MEDICINE

## 2023-06-12 PROCEDURE — 36415 COLL VENOUS BLD VENIPUNCTURE: CPT | Performed by: INTERNAL MEDICINE

## 2023-06-13 ENCOUNTER — OFFICE VISIT (OUTPATIENT)
Dept: HEMATOLOGY/ONCOLOGY | Facility: CLINIC | Age: 68
End: 2023-06-13
Payer: OTHER GOVERNMENT

## 2023-06-13 VITALS
DIASTOLIC BLOOD PRESSURE: 76 MMHG | TEMPERATURE: 98 F | BODY MASS INDEX: 29.62 KG/M2 | WEIGHT: 200 LBS | HEART RATE: 100 BPM | RESPIRATION RATE: 16 BRPM | SYSTOLIC BLOOD PRESSURE: 166 MMHG | HEIGHT: 69 IN

## 2023-06-13 DIAGNOSIS — R74.8 ELEVATED LIVER ENZYMES: ICD-10-CM

## 2023-06-13 LAB
AORTIC ROOT ANNULUS: 2.8 CM
AORTIC VALVE CUSP SEPERATION: 1.7 CM
AV INDEX (PROSTH): 1.13
AV MEAN GRADIENT: 8 MMHG
AV PEAK GRADIENT: 14 MMHG
AV VALVE AREA: 3.55 CM2
AV VELOCITY RATIO: 0.9
BSA FOR ECHO PROCEDURE: 2.11 M2
CV ECHO LV RWT: 0.65 CM
DOP CALC AO PEAK VEL: 1.88 M/S
DOP CALC AO VTI: 30.8 CM
DOP CALC LVOT AREA: 3.1 CM2
DOP CALC LVOT DIAMETER: 2 CM
DOP CALC LVOT PEAK VEL: 1.69 M/S
DOP CALC LVOT STROKE VOLUME: 109.27 CM3
DOP CALCLVOT PEAK VEL VTI: 34.8 CM
E WAVE DECELERATION TIME: 190 MSEC
E/A RATIO: 0.6
E/E' RATIO: 8.11 M/S
ECHO LV POSTERIOR WALL: 1.13 CM (ref 0.6–1.1)
EJECTION FRACTION: 70 %
FRACTIONAL SHORTENING: 40 % (ref 28–44)
INTERVENTRICULAR SEPTUM: 1.35 CM (ref 0.6–1.1)
IVRT: 90 MSEC
LEFT ATRIUM SIZE: 4 CM
LEFT INTERNAL DIMENSION IN SYSTOLE: 2.08 CM (ref 2.1–4)
LEFT VENTRICLE DIASTOLIC VOLUME INDEX: 24.25 ML/M2
LEFT VENTRICLE DIASTOLIC VOLUME: 50.2 ML
LEFT VENTRICLE MASS INDEX: 68 G/M2
LEFT VENTRICLE SYSTOLIC VOLUME INDEX: 6.8 ML/M2
LEFT VENTRICLE SYSTOLIC VOLUME: 14.1 ML
LEFT VENTRICULAR INTERNAL DIMENSION IN DIASTOLE: 3.48 CM (ref 3.5–6)
LEFT VENTRICULAR MASS: 141.68 G
LV LATERAL E/E' RATIO: 7.3 M/S
LV SEPTAL E/E' RATIO: 9.13 M/S
LVOT MG: 6 MMHG
LVOT MV: 1.15 CM/S
MV PEAK A VEL: 1.22 M/S
MV PEAK E VEL: 0.73 M/S
MV STENOSIS PRESSURE HALF TIME: 32 MS
MV VALVE AREA P 1/2 METHOD: 6.88 CM2
PISA TR MAX VEL: 2.33 M/S
RA PRESSURE: 3 MMHG
RIGHT VENTRICULAR END-DIASTOLIC DIMENSION: 2.68 CM
TDI LATERAL: 0.1 M/S
TDI SEPTAL: 0.08 M/S
TDI: 0.09 M/S
TR MAX PG: 22 MMHG
TV REST PULMONARY ARTERY PRESSURE: 25 MMHG

## 2023-06-13 PROCEDURE — 99213 PR OFFICE/OUTPT VISIT, EST, LEVL III, 20-29 MIN: ICD-10-PCS | Mod: S$GLB,,, | Performed by: INTERNAL MEDICINE

## 2023-06-13 PROCEDURE — 99213 OFFICE O/P EST LOW 20 MIN: CPT | Mod: S$GLB,,, | Performed by: INTERNAL MEDICINE

## 2023-06-13 RX ORDER — EXEMESTANE 25 MG/1
25 TABLET ORAL DAILY
Qty: 30 TABLET | Refills: 3 | Status: SHIPPED | OUTPATIENT
Start: 2023-06-13 | End: 2023-11-08

## 2023-06-13 NOTE — ASSESSMENT & PLAN NOTE
Patient's LFTs have normalized since getting off the Letrozole.  I discussed the use of Exemestane to see if this would cause a similar problem.  Would check LFTs monthly and see her again in 3 months.  If these go up then would stop this med.

## 2023-06-13 NOTE — PROGRESS NOTES
PROGRESS NOTE    Subjective:       Patient ID: Naye Gallegos is a 67 y.o. female.    3/23/2022:  Grouped microcalcification in left breast.     3/30/2022:  Left breast microcalcifications at 12 oclock  indeterm masses at 10 oclock at 5 and 6 cm from nipple.   Mixed echogenicity mass at 10 oclock 2 cm from nipple    4/12/2022:  MRI Breast Impression:   1. 17 x 16 x 10 mm irregular non masslike enhancement at 12:00 o'clock position of left breast 5 cm from the nipple correlates with site of recently identified pleomorphic microcalcifications and is suspicious for malignancy.  Previous recommendation for stereotactic biopsy of the microcalcifications is unchanged.  2. Previous indeterminate hypoechoic masses in left breast near 10 o'clock position correlate with foci of fat necrosis, requiring no additional workup or follow-up.  3. Moderate bilateral background parenchymal enhancement, within numerable cysts throughout bilateral breasts, suggesting fibrocystic change.  Recommendation: Stereotactic biopsy of left breast pleomorphic microcalcifications    4/26/2022:  Needle biopsy:  Left breast 5cm from nipple  DCIS  ER: 75%, CA: 75%,    5/24/2022:  Left breast partial mastectomy:   LEFT BREAST, PARTIAL MASTECTOMY:   - MICROINVASIVE CARCINOMA IN THE BACKGROUND OF EXTENSIVE HIGH AND    INTERMEDIATE NUCLEAR   GRADE DUCTAL CARCINOMA IN SITU, CRIBRIFORM, MICROPAPILLARY AND SOLID    AND COMEDOCARCINOMA.   - SURGICAL MARGINS NEGATIVE FOR INVASIVE CARCINOMA.   - DUCTAL CARCINOMA INVOLVES SUPERIOR AND INFERIOR SURGICAL MARGINS (SEE    CASE SUMMARY     FOR FURTHER SURGICAL MARGIN EVALUATION).   - RADIAL SCLEROSING LESION.   - ORGANIZING PREVIOUS BIOPSY SITE.    Receptors on invasive: ER: 20%, CA 0%-    7/18/2022:  Bilateral Mastectomy:  Left: DCIS in far medial breast.  Margins widely clear.  SLN negative  BRCA negative    Right:  Benign.    9/1/2022:  Started  "Anastrozole.     Plan: single agent AI therapy for five years    LFT follow up shows normalization.     Chief Complaint:  No chief complaint on file.    Breast cancer follow up.     History of Present Illness:   Naye Gallegos is a 67 y.o. female who presents for follow up of breast cancer.      Patient continues on anastrozole of today, 2/2/2023 and tolerating well.       Family and Social history reviewed and is unchanged from 8/11/2022      ROS:  Review of Systems   Constitutional:  Negative for appetite change, fever and unexpected weight change.   HENT:  Negative for mouth sores.    Eyes:  Negative for visual disturbance.   Respiratory:  Negative for cough and shortness of breath.    Cardiovascular:  Negative for chest pain and leg swelling.   Gastrointestinal:  Negative for abdominal pain, blood in stool and diarrhea.   Genitourinary:  Negative for frequency and hematuria.   Musculoskeletal:  Negative for back pain.   Skin:  Negative for rash.   Neurological:  Negative for headaches.   Hematological:  Negative for adenopathy.   Psychiatric/Behavioral:  The patient is not nervous/anxious.         Current Outpatient Medications:     atorvastatin (LIPITOR) 10 MG tablet, Take 1 tablet (10 mg total) by mouth once daily., Disp: 90 tablet, Rfl: 3    calcium-vitamin D3 (OS-CLOTILDE 500 + D3) 500 mg-5 mcg (200 unit) per tablet, Take 1 tablet by mouth 2 (two) times daily with meals., Disp: , Rfl:     telmisartan-hydrochlorothiazide (MICARDIS HCT) 40-12.5 mg per tablet, Take 1 tablet by mouth once daily., Disp: 90 tablet, Rfl: 3    vit C/E/Zn/coppr/lutein/zeaxan (OCUVITE LUTEIN AND ZEAXANTHIN ORAL), Take 2 capsules by mouth once daily., Disp: , Rfl:     exemestane (AROMASIN) 25 mg tablet, Take 1 tablet (25 mg total) by mouth once daily., Disp: 30 tablet, Rfl: 3        Objective:       Physical Examination:     BP (!) 166/76   Pulse 100   Temp 97.9 °F (36.6 °C)   Resp 16   Ht 5' 9" (1.753 m)   Wt 90.7 kg (200 lb)   " BMI 29.53 kg/m²     Physical Exam  Constitutional:       Appearance: She is well-developed.   HENT:      Head: Normocephalic and atraumatic.      Right Ear: External ear normal.      Left Ear: External ear normal.   Eyes:      Conjunctiva/sclera: Conjunctivae normal.      Pupils: Pupils are equal, round, and reactive to light.   Neck:      Thyroid: No thyromegaly.      Trachea: No tracheal deviation.   Cardiovascular:      Rate and Rhythm: Normal rate and regular rhythm.      Heart sounds: Normal heart sounds.   Pulmonary:      Effort: Pulmonary effort is normal.      Breath sounds: Normal breath sounds.   Abdominal:      General: Bowel sounds are normal. There is no distension.      Palpations: Abdomen is soft. There is no mass.      Tenderness: There is no abdominal tenderness.   Skin:     Findings: No rash.   Neurological:      Comments: Neuro intact througout   Psychiatric:         Behavior: Behavior normal.         Thought Content: Thought content normal.         Judgment: Judgment normal.       Labs:   No results found for this or any previous visit (from the past 336 hour(s)).    CMP  Sodium   Date Value Ref Range Status   06/12/2023 137 136 - 145 mmol/L Final     Potassium   Date Value Ref Range Status   06/12/2023 3.5 3.5 - 5.1 mmol/L Final     Chloride   Date Value Ref Range Status   06/12/2023 103 95 - 110 mmol/L Final     CO2   Date Value Ref Range Status   06/12/2023 28 23 - 29 mmol/L Final     Glucose   Date Value Ref Range Status   06/12/2023 109 70 - 110 mg/dL Final     BUN   Date Value Ref Range Status   06/12/2023 15 8 - 23 mg/dL Final     Creatinine   Date Value Ref Range Status   06/12/2023 0.9 0.5 - 1.4 mg/dL Final     Calcium   Date Value Ref Range Status   06/12/2023 9.1 8.7 - 10.5 mg/dL Final     Total Protein   Date Value Ref Range Status   06/12/2023 7.2 6.0 - 8.4 g/dL Final     Albumin   Date Value Ref Range Status   06/12/2023 4.5 3.5 - 5.2 g/dL Final     Total Bilirubin   Date Value Ref  Range Status   06/12/2023 0.8 0.1 - 1.0 mg/dL Final     Comment:     For infants and newborns, interpretation of results should be based  on gestational age, weight and in agreement with clinical  observations.    Premature Infant recommended reference ranges:  Up to 24 hours.............<8.0 mg/dL  Up to 48 hours............<12.0 mg/dL  3-5 days..................<15.0 mg/dL  6-29 days.................<15.0 mg/dL       Alkaline Phosphatase   Date Value Ref Range Status   06/12/2023 109 55 - 135 U/L Final     AST   Date Value Ref Range Status   06/12/2023 24 10 - 40 U/L Final     ALT   Date Value Ref Range Status   06/12/2023 34 10 - 44 U/L Final     Anion Gap   Date Value Ref Range Status   06/12/2023 6 (L) 8 - 16 mmol/L Final     eGFR if    Date Value Ref Range Status   05/19/2022 >60.0 >60 mL/min/1.73 m^2 Final     eGFR if non    Date Value Ref Range Status   05/19/2022 >60.0 >60 mL/min/1.73 m^2 Final     Comment:     Calculation used to obtain the estimated glomerular filtration  rate (eGFR) is the CKD-EPI equation.        No results found for: CEA  No results found for: PSA        Assessment/Plan:     Problem List Items Addressed This Visit       Elevated liver enzymes     Patient's LFTs have normalized since getting off the Letrozole.  I discussed the use of Exemestane to see if this would cause a similar problem.  Would check LFTs monthly and see her again in 3 months.  If these go up then would stop this med.           Relevant Medications    exemestane (AROMASIN) 25 mg tablet    Other Relevant Orders    Comprehensive Metabolic Panel     Discussion:     Follow up in about 3 months (around 9/13/2023).      Electronically signed by Lasha Monzon

## 2023-09-08 ENCOUNTER — LAB VISIT (OUTPATIENT)
Dept: LAB | Facility: HOSPITAL | Age: 68
End: 2023-09-08
Attending: INTERNAL MEDICINE
Payer: OTHER GOVERNMENT

## 2023-09-08 DIAGNOSIS — R74.8 ELEVATED LIVER ENZYMES: ICD-10-CM

## 2023-09-08 LAB
ALBUMIN SERPL BCP-MCNC: 4.3 G/DL (ref 3.5–5.2)
ALP SERPL-CCNC: 169 U/L (ref 55–135)
ALT SERPL W/O P-5'-P-CCNC: 92 U/L (ref 10–44)
ANION GAP SERPL CALC-SCNC: 7 MMOL/L (ref 8–16)
AST SERPL-CCNC: 41 U/L (ref 10–40)
BILIRUB SERPL-MCNC: 0.9 MG/DL (ref 0.1–1)
BUN SERPL-MCNC: 18 MG/DL (ref 8–23)
CALCIUM SERPL-MCNC: 9.7 MG/DL (ref 8.7–10.5)
CHLORIDE SERPL-SCNC: 106 MMOL/L (ref 95–110)
CO2 SERPL-SCNC: 28 MMOL/L (ref 23–29)
CREAT SERPL-MCNC: 0.8 MG/DL (ref 0.5–1.4)
EST. GFR  (NO RACE VARIABLE): >60 ML/MIN/1.73 M^2
GLUCOSE SERPL-MCNC: 79 MG/DL (ref 70–110)
POTASSIUM SERPL-SCNC: 3.7 MMOL/L (ref 3.5–5.1)
PROT SERPL-MCNC: 7.1 G/DL (ref 6–8.4)
SODIUM SERPL-SCNC: 141 MMOL/L (ref 136–145)

## 2023-09-08 PROCEDURE — 80053 COMPREHEN METABOLIC PANEL: CPT | Performed by: INTERNAL MEDICINE

## 2023-09-08 PROCEDURE — 36415 COLL VENOUS BLD VENIPUNCTURE: CPT | Performed by: INTERNAL MEDICINE

## 2023-09-13 ENCOUNTER — OFFICE VISIT (OUTPATIENT)
Dept: HEMATOLOGY/ONCOLOGY | Facility: CLINIC | Age: 68
End: 2023-09-13
Payer: OTHER GOVERNMENT

## 2023-09-13 VITALS
WEIGHT: 205.69 LBS | DIASTOLIC BLOOD PRESSURE: 65 MMHG | HEIGHT: 69 IN | TEMPERATURE: 99 F | BODY MASS INDEX: 30.47 KG/M2 | SYSTOLIC BLOOD PRESSURE: 147 MMHG | HEART RATE: 95 BPM | RESPIRATION RATE: 18 BRPM

## 2023-09-13 DIAGNOSIS — Z17.0 MALIGNANT NEOPLASM OF CENTRAL PORTION OF LEFT BREAST IN FEMALE, ESTROGEN RECEPTOR POSITIVE: ICD-10-CM

## 2023-09-13 DIAGNOSIS — C50.112 MALIGNANT NEOPLASM OF CENTRAL PORTION OF LEFT BREAST IN FEMALE, ESTROGEN RECEPTOR POSITIVE: ICD-10-CM

## 2023-09-13 PROCEDURE — 99213 PR OFFICE/OUTPT VISIT, EST, LEVL III, 20-29 MIN: ICD-10-PCS | Mod: S$GLB,,, | Performed by: INTERNAL MEDICINE

## 2023-09-13 PROCEDURE — 99213 OFFICE O/P EST LOW 20 MIN: CPT | Mod: S$GLB,,, | Performed by: INTERNAL MEDICINE

## 2023-09-13 NOTE — ASSESSMENT & PLAN NOTE
I discussed this today.  Her LFTs are increasing again on exemestane.  Note is made that they improved when we took treatment break from prior AI, therefore, I feel this is certainly due to the AI as a class effect and Noriega likely to do the same.  Will d/c this med.  She is only microinvasive with ER of 20% so there is not much loss of benefit.  Discussed this today.

## 2023-09-13 NOTE — PROGRESS NOTES
PROGRESS NOTE    Subjective:       Patient ID: Naye Gallegos is a 67 y.o. female.    3/23/2022:  Grouped microcalcification in left breast.     3/30/2022:  Left breast microcalcifications at 12 oclock  indeterm masses at 10 oclock at 5 and 6 cm from nipple.   Mixed echogenicity mass at 10 oclock 2 cm from nipple    4/12/2022:  MRI Breast Impression:   1. 17 x 16 x 10 mm irregular non masslike enhancement at 12:00 o'clock position of left breast 5 cm from the nipple correlates with site of recently identified pleomorphic microcalcifications and is suspicious for malignancy.  Previous recommendation for stereotactic biopsy of the microcalcifications is unchanged.  2. Previous indeterminate hypoechoic masses in left breast near 10 o'clock position correlate with foci of fat necrosis, requiring no additional workup or follow-up.  3. Moderate bilateral background parenchymal enhancement, within numerable cysts throughout bilateral breasts, suggesting fibrocystic change.  Recommendation: Stereotactic biopsy of left breast pleomorphic microcalcifications    4/26/2022:  Needle biopsy:  Left breast 5cm from nipple  DCIS  ER: 75%, DE: 75%,    5/24/2022:  Left breast partial mastectomy:   LEFT BREAST, PARTIAL MASTECTOMY:   - MICROINVASIVE CARCINOMA IN THE BACKGROUND OF EXTENSIVE HIGH AND    INTERMEDIATE NUCLEAR   GRADE DUCTAL CARCINOMA IN SITU, CRIBRIFORM, MICROPAPILLARY AND SOLID    AND COMEDOCARCINOMA.   - SURGICAL MARGINS NEGATIVE FOR INVASIVE CARCINOMA.   - DUCTAL CARCINOMA INVOLVES SUPERIOR AND INFERIOR SURGICAL MARGINS (SEE    CASE SUMMARY     FOR FURTHER SURGICAL MARGIN EVALUATION).   - RADIAL SCLEROSING LESION.   - ORGANIZING PREVIOUS BIOPSY SITE.    Receptors on invasive: ER: 20%, DE 0%-    7/18/2022:  Bilateral Mastectomy:  Left: DCIS in far medial breast.  Margins widely clear.  SLN negative  BRCA negative    Right:  Benign.    9/1/2022:  Started  Anastrozole-changed to exemestane due to increased LFTs, LFTs did decline during treatment break then skyler again on exemestane.  Drug discontinued to protect liver.      Plan: single agent AI therapy for five years    LFT follow up shows normalization.     Chief Complaint:  No chief complaint on file.  Breast cancer follow up.     History of Present Illness:   Naye Gallegos is a 67 y.o. female who presents for follow up of breast cancer.      Patient has been on exemestane, having increasing joint pain and her LFTs are going up.       Family and Social history reviewed and is unchanged from 8/11/2022      ROS:  Review of Systems   Constitutional:  Negative for appetite change, fever and unexpected weight change.   HENT:  Negative for mouth sores.    Eyes:  Negative for visual disturbance.   Respiratory:  Negative for cough and shortness of breath.    Cardiovascular:  Negative for chest pain and leg swelling.   Gastrointestinal:  Negative for abdominal pain, blood in stool and diarrhea.   Genitourinary:  Negative for frequency and hematuria.   Musculoskeletal:  Negative for back pain.   Skin:  Negative for rash.   Neurological:  Negative for headaches.   Hematological:  Negative for adenopathy.   Psychiatric/Behavioral:  The patient is not nervous/anxious.           Current Outpatient Medications:     atorvastatin (LIPITOR) 10 MG tablet, Take 1 tablet (10 mg total) by mouth once daily., Disp: 90 tablet, Rfl: 3    calcium-vitamin D3 (OS-CLOTILDE 500 + D3) 500 mg-5 mcg (200 unit) per tablet, Take 1 tablet by mouth 2 (two) times daily with meals., Disp: , Rfl:     exemestane (AROMASIN) 25 mg tablet, Take 1 tablet (25 mg total) by mouth once daily., Disp: 30 tablet, Rfl: 3    telmisartan-hydrochlorothiazide (MICARDIS HCT) 40-12.5 mg per tablet, Take 1 tablet by mouth once daily., Disp: 90 tablet, Rfl: 3    vit C/E/Zn/coppr/lutein/zeaxan (OCUVITE LUTEIN AND ZEAXANTHIN ORAL), Take 2 capsules by mouth once daily.,  "Disp: , Rfl:         Objective:       Physical Examination:     BP (!) 147/65   Pulse 95   Temp 98.6 °F (37 °C)   Resp 18   Ht 5' 9" (1.753 m)   Wt 93.3 kg (205 lb 11.2 oz)   BMI 30.38 kg/m²     Physical Exam  Constitutional:       Appearance: She is well-developed.   HENT:      Head: Normocephalic and atraumatic.      Right Ear: External ear normal.      Left Ear: External ear normal.   Eyes:      Conjunctiva/sclera: Conjunctivae normal.      Pupils: Pupils are equal, round, and reactive to light.   Neck:      Thyroid: No thyromegaly.      Trachea: No tracheal deviation.   Cardiovascular:      Rate and Rhythm: Normal rate and regular rhythm.      Heart sounds: Normal heart sounds.   Pulmonary:      Effort: Pulmonary effort is normal.      Breath sounds: Normal breath sounds.   Abdominal:      General: Bowel sounds are normal. There is no distension.      Palpations: Abdomen is soft. There is no mass.      Tenderness: There is no abdominal tenderness.   Skin:     Findings: No rash.   Neurological:      Comments: Neuro intact througout   Psychiatric:         Behavior: Behavior normal.         Thought Content: Thought content normal.         Judgment: Judgment normal.         Labs:   No results found for this or any previous visit (from the past 336 hour(s)).    CMP  Sodium   Date Value Ref Range Status   09/08/2023 141 136 - 145 mmol/L Final     Potassium   Date Value Ref Range Status   09/08/2023 3.7 3.5 - 5.1 mmol/L Final     Chloride   Date Value Ref Range Status   09/08/2023 106 95 - 110 mmol/L Final     CO2   Date Value Ref Range Status   09/08/2023 28 23 - 29 mmol/L Final     Glucose   Date Value Ref Range Status   09/08/2023 79 70 - 110 mg/dL Final     BUN   Date Value Ref Range Status   09/08/2023 18 8 - 23 mg/dL Final     Creatinine   Date Value Ref Range Status   09/08/2023 0.8 0.5 - 1.4 mg/dL Final     Calcium   Date Value Ref Range Status   09/08/2023 9.7 8.7 - 10.5 mg/dL Final     Total Protein " "  Date Value Ref Range Status   09/08/2023 7.1 6.0 - 8.4 g/dL Final     Albumin   Date Value Ref Range Status   09/08/2023 4.3 3.5 - 5.2 g/dL Final     Total Bilirubin   Date Value Ref Range Status   09/08/2023 0.9 0.1 - 1.0 mg/dL Final     Comment:     For infants and newborns, interpretation of results should be based  on gestational age, weight and in agreement with clinical  observations.    Premature Infant recommended reference ranges:  Up to 24 hours.............<8.0 mg/dL  Up to 48 hours............<12.0 mg/dL  3-5 days..................<15.0 mg/dL  6-29 days.................<15.0 mg/dL       Alkaline Phosphatase   Date Value Ref Range Status   09/08/2023 169 (H) 55 - 135 U/L Final     AST   Date Value Ref Range Status   09/08/2023 41 (H) 10 - 40 U/L Final     ALT   Date Value Ref Range Status   09/08/2023 92 (H) 10 - 44 U/L Final     Anion Gap   Date Value Ref Range Status   09/08/2023 7 (L) 8 - 16 mmol/L Final     eGFR if    Date Value Ref Range Status   05/19/2022 >60.0 >60 mL/min/1.73 m^2 Final     eGFR if non    Date Value Ref Range Status   05/19/2022 >60.0 >60 mL/min/1.73 m^2 Final     Comment:     Calculation used to obtain the estimated glomerular filtration  rate (eGFR) is the CKD-EPI equation.        No results found for: "CEA"  No results found for: "PSA"        Assessment/Plan:     Problem List Items Addressed This Visit       Microinvasive Left Breast Cancer     I discussed this today.  Her LFTs are increasing again on exemestane.  Note is made that they improved when we took treatment break from prior AI, therefore, I feel this is certainly due to the AI as a class effect and Noriega likely to do the same.  Will d/c this med.  She is only microinvasive with ER of 20% so there is not much loss of benefit.  Discussed this today.          Relevant Orders    CBC Auto Differential    Comprehensive Metabolic Panel     Discussion:     Follow up in about 4 months (around " 1/13/2024).      Electronically signed by Lasha Monzon

## 2023-09-14 ENCOUNTER — PATIENT MESSAGE (OUTPATIENT)
Dept: ADMINISTRATIVE | Facility: HOSPITAL | Age: 68
End: 2023-09-14
Payer: OTHER GOVERNMENT

## 2023-11-08 ENCOUNTER — OFFICE VISIT (OUTPATIENT)
Dept: FAMILY MEDICINE | Facility: CLINIC | Age: 68
End: 2023-11-08
Payer: OTHER GOVERNMENT

## 2023-11-08 VITALS
HEART RATE: 86 BPM | WEIGHT: 206 LBS | BODY MASS INDEX: 30.51 KG/M2 | HEIGHT: 69 IN | DIASTOLIC BLOOD PRESSURE: 68 MMHG | SYSTOLIC BLOOD PRESSURE: 138 MMHG

## 2023-11-08 DIAGNOSIS — M25.461 SWELLING OF JOINT OF RIGHT KNEE: ICD-10-CM

## 2023-11-08 DIAGNOSIS — E78.2 MIXED HYPERLIPIDEMIA: ICD-10-CM

## 2023-11-08 DIAGNOSIS — I10 ESSENTIAL HYPERTENSION: Primary | ICD-10-CM

## 2023-11-08 DIAGNOSIS — Z76.0 MEDICATION REFILL: ICD-10-CM

## 2023-11-08 LAB
ALBUMIN/CREAT UR: 1 MCG/MG CREAT
APPEARANCE UR: CLEAR
BACTERIA #/AREA URNS HPF: ABNORMAL /HPF
BACTERIA UR CULT: ABNORMAL
BACTERIA UR CULT: ABNORMAL
BILIRUB UR QL STRIP: NEGATIVE
CHOLEST SERPL-MCNC: 166 MG/DL
CHOLEST/HDLC SERPL: 2.6 (CALC)
COLOR UR: YELLOW
CREAT UR-MCNC: 136 MG/DL (ref 20–275)
GLUCOSE UR QL STRIP: NEGATIVE
HDLC SERPL-MCNC: 65 MG/DL
HGB UR QL STRIP: NEGATIVE
HYALINE CASTS #/AREA URNS LPF: ABNORMAL /LPF
KETONES UR QL STRIP: NEGATIVE
LDLC SERPL CALC-MCNC: 83 MG/DL (CALC)
LEUKOCYTE ESTERASE UR QL STRIP: ABNORMAL
MICROALBUMIN UR-MCNC: 0.2 MG/DL
NITRITE UR QL STRIP: NEGATIVE
NONHDLC SERPL-MCNC: 101 MG/DL (CALC)
PH UR STRIP: 8 [PH] (ref 5–8)
PROT UR QL STRIP: NEGATIVE
RBC #/AREA URNS HPF: ABNORMAL /HPF
SERVICE CMNT-IMP: ABNORMAL
SP GR UR STRIP: 1.02 (ref 1–1.03)
SQUAMOUS #/AREA URNS HPF: ABNORMAL /HPF
TRIGL SERPL-MCNC: 85 MG/DL
TSH SERPL-ACNC: 2.42 MIU/L (ref 0.4–4.5)
WBC #/AREA URNS HPF: ABNORMAL /HPF

## 2023-11-08 PROCEDURE — 99214 PR OFFICE/OUTPT VISIT, EST, LEVL IV, 30-39 MIN: ICD-10-PCS | Mod: S$GLB,,, | Performed by: FAMILY MEDICINE

## 2023-11-08 PROCEDURE — 99214 OFFICE O/P EST MOD 30 MIN: CPT | Mod: S$GLB,,, | Performed by: FAMILY MEDICINE

## 2023-11-08 RX ORDER — ATORVASTATIN CALCIUM 10 MG/1
10 TABLET, FILM COATED ORAL DAILY
Qty: 90 TABLET | Refills: 3 | Status: SHIPPED | OUTPATIENT
Start: 2023-11-08

## 2023-11-08 RX ORDER — TELMISARTAN AND HYDROCHLORTHIAZIDE 40; 12.5 MG/1; MG/1
1 TABLET ORAL DAILY
Qty: 90 TABLET | Refills: 3 | Status: SHIPPED | OUTPATIENT
Start: 2023-11-08

## 2023-11-08 NOTE — PROGRESS NOTES
SUBJECTIVE:    Patient ID: Naye Gallegos is a 67 y.o. female.    Chief Complaint: Follow-up (6 mo f/u//no med bottles//declined mammogram due to mastectomy//tc)      Pt here to checkup on acute and chronic conditions.    Seeing (Omari) for hx breast cancer (3/2022), mastectomy, and restorative breast surgery. No longer on anastrozole due to elevated liver enzymes. (Omari). Next appt is next month. (No chemo or radiation)    BP is doing ok today.  Denies CP/SOB/HA.     Her right knee is swollen. It gets aggravated at night when she lays down. Not walking as much as she was. Walks for exercise.  Does ache some at night.  Has has a shot in it before about 10 years a go.     Follows Dr. Loredo (Derm) for full body check    Had labs done: LDL 83, TSH 2.42, microalb/cr 2    Has been having some issues with allergies, not taking anything otc.   -------------------------------------------------------------------------------  Pap by Dr. ELEN Lopez, due in May.   Has a hx macular degeneration.  (Bhardwaj). Next appt in Dec.  Taking AREDS 2.  Has not been taking anything for her bones, Nl          Lab Visit on 09/08/2023   Component Date Value Ref Range Status    Sodium 09/08/2023 141  136 - 145 mmol/L Final    Potassium 09/08/2023 3.7  3.5 - 5.1 mmol/L Final    Chloride 09/08/2023 106  95 - 110 mmol/L Final    CO2 09/08/2023 28  23 - 29 mmol/L Final    Glucose 09/08/2023 79  70 - 110 mg/dL Final    BUN 09/08/2023 18  8 - 23 mg/dL Final    Creatinine 09/08/2023 0.8  0.5 - 1.4 mg/dL Final    Calcium 09/08/2023 9.7  8.7 - 10.5 mg/dL Final    Total Protein 09/08/2023 7.1  6.0 - 8.4 g/dL Final    Albumin 09/08/2023 4.3  3.5 - 5.2 g/dL Final    Total Bilirubin 09/08/2023 0.9  0.1 - 1.0 mg/dL Final    Comment: For infants and newborns, interpretation of results should be based  on gestational age, weight and in agreement with clinical  observations.    Premature Infant recommended reference ranges:  Up to 24  hours.............<8.0 mg/dL  Up to 48 hours............<12.0 mg/dL  3-5 days..................<15.0 mg/dL  6-29 days.................<15.0 mg/dL      Alkaline Phosphatase 09/08/2023 169 (H)  55 - 135 U/L Final    AST 09/08/2023 41 (H)  10 - 40 U/L Final    ALT 09/08/2023 92 (H)  10 - 44 U/L Final    eGFR 09/08/2023 >60.0  >60 mL/min/1.73 m^2 Final    Anion Gap 09/08/2023 7 (L)  8 - 16 mmol/L Final   Lab Visit on 06/12/2023   Component Date Value Ref Range Status    Sodium 06/12/2023 137  136 - 145 mmol/L Final    Potassium 06/12/2023 3.5  3.5 - 5.1 mmol/L Final    Chloride 06/12/2023 103  95 - 110 mmol/L Final    CO2 06/12/2023 28  23 - 29 mmol/L Final    Glucose 06/12/2023 109  70 - 110 mg/dL Final    BUN 06/12/2023 15  8 - 23 mg/dL Final    Creatinine 06/12/2023 0.9  0.5 - 1.4 mg/dL Final    Calcium 06/12/2023 9.1  8.7 - 10.5 mg/dL Final    Total Protein 06/12/2023 7.2  6.0 - 8.4 g/dL Final    Albumin 06/12/2023 4.5  3.5 - 5.2 g/dL Final    Total Bilirubin 06/12/2023 0.8  0.1 - 1.0 mg/dL Final    Comment: For infants and newborns, interpretation of results should be based  on gestational age, weight and in agreement with clinical  observations.    Premature Infant recommended reference ranges:  Up to 24 hours.............<8.0 mg/dL  Up to 48 hours............<12.0 mg/dL  3-5 days..................<15.0 mg/dL  6-29 days.................<15.0 mg/dL      Alkaline Phosphatase 06/12/2023 109  55 - 135 U/L Final    AST 06/12/2023 24  10 - 40 U/L Final    ALT 06/12/2023 34  10 - 44 U/L Final    Anion Gap 06/12/2023 6 (L)  8 - 16 mmol/L Final    eGFR 06/12/2023 >60.0  >60 mL/min/1.73 m^2 Final   Hospital Outpatient Visit on 06/01/2023   Component Date Value Ref Range Status    BSA 06/01/2023 2.11  m2 Final    TDI SEPTAL 06/01/2023 0.08  m/s Final    LV LATERAL E/E' RATIO 06/01/2023 7.30  m/s Final    LV SEPTAL E/E' RATIO 06/01/2023 9.13  m/s Final    Left Ventricular Outflow Tract Kelly* 06/01/2023 1.15  cm/s Final    Left  Ventricular Outflow Tract Kelly* 06/01/2023 6.00  mmHg Final    AORTIC VALVE CUSP SEPERATION 06/01/2023 1.70  cm Final    TDI LATERAL 06/01/2023 0.10  m/s Final    LVIDd 06/01/2023 3.48 (A)  3.5 - 6.0 cm Final    IVS 06/01/2023 1.35 (A)  0.6 - 1.1 cm Final    Posterior Wall 06/01/2023 1.13 (A)  0.6 - 1.1 cm Final    Ao root annulus 06/01/2023 2.80  cm Final    LVIDs 06/01/2023 2.08 (A)  2.1 - 4.0 cm Final    FS 06/01/2023 40  28 - 44 % Final    LV mass 06/01/2023 141.68  g Final    LA size 06/01/2023 4.00  cm Final    RVDD 06/01/2023 2.68  cm Final    Left Ventricle Relative Wall Thick* 06/01/2023 0.65  cm Final    AV mean gradient 06/01/2023 8  mmHg Final    AV valve area 06/01/2023 3.55  cm2 Final    AV Velocity Ratio 06/01/2023 0.90   Final    AV index (prosthetic) 06/01/2023 1.13   Final    MV valve area p 1/2 method 06/01/2023 6.88  cm2 Final    E/A ratio 06/01/2023 0.60   Final    Mean e' 06/01/2023 0.09  m/s Final    E wave deceleration time 06/01/2023 190.00  msec Final    IVRT 06/01/2023 90.00  msec Final    LVOT diameter 06/01/2023 2.00  cm Final    LVOT area 06/01/2023 3.1  cm2 Final    LVOT peak michelle 06/01/2023 1.69  m/s Final    LVOT peak VTI 06/01/2023 34.80  cm Final    Ao peak michelle 06/01/2023 1.88  m/s Final    Ao VTI 06/01/2023 30.8  cm Final    LVOT stroke volume 06/01/2023 109.27  cm3 Final    AV peak gradient 06/01/2023 14  mmHg Final    E/E' ratio 06/01/2023 8.11  m/s Final    MV Peak E Michelle 06/01/2023 0.73  m/s Final    TR Max Michelle 06/01/2023 2.33  m/s Final    MV stenosis pressure 1/2 time 06/01/2023 32.00  ms Final    MV Peak A Michelle 06/01/2023 1.22  m/s Final    LV Systolic Volume 06/01/2023 14.10  mL Final    LV Systolic Volume Index 06/01/2023 6.8  mL/m2 Final    LV Diastolic Volume 06/01/2023 50.20  mL Final    LV Diastolic Volume Index 06/01/2023 24.25  mL/m2 Final    LV Mass Index 06/01/2023 68  g/m2 Final    Triscuspid Valve Regurgitation Pea* 06/01/2023 22  mmHg Final    Right Atrial Pressure  (from IVC) 06/01/2023 3  mmHg Final    EF 06/01/2023 70  % Final    TV resting pulmonary artery pressu* 06/01/2023 25  mmHg Final   Lab Visit on 04/18/2023   Component Date Value Ref Range Status    WBC 04/18/2023 6.64  3.90 - 12.70 K/uL Final    RBC 04/18/2023 5.21  4.00 - 5.40 M/uL Final    Hemoglobin 04/18/2023 13.7  12.0 - 16.0 g/dL Final    Hematocrit 04/18/2023 43.6  37.0 - 48.5 % Final    MCV 04/18/2023 84  82 - 98 fL Final    MCH 04/18/2023 26.3 (L)  27.0 - 31.0 pg Final    MCHC 04/18/2023 31.4 (L)  32.0 - 36.0 g/dL Final    RDW 04/18/2023 17.9 (H)  11.5 - 14.5 % Final    Platelets 04/18/2023 287  150 - 450 K/uL Final    MPV 04/18/2023 11.4  9.2 - 12.9 fL Final    Immature Granulocytes 04/18/2023 0.2  0.0 - 0.5 % Final    Gran # (ANC) 04/18/2023 4.5  1.8 - 7.7 K/uL Final    Immature Grans (Abs) 04/18/2023 0.01  0.00 - 0.04 K/uL Final    Comment: Mild elevation in immature granulocytes is non specific and   can be seen in a variety of conditions including stress response,   acute inflammation, trauma and pregnancy. Correlation with other   laboratory and clinical findings is essential.      Lymph # 04/18/2023 1.2  1.0 - 4.8 K/uL Final    Mono # 04/18/2023 0.5  0.3 - 1.0 K/uL Final    Eos # 04/18/2023 0.3  0.0 - 0.5 K/uL Final    Baso # 04/18/2023 0.05  0.00 - 0.20 K/uL Final    nRBC 04/18/2023 0  0 /100 WBC Final    Gran % 04/18/2023 67.8  38.0 - 73.0 % Final    Lymph % 04/18/2023 18.7  18.0 - 48.0 % Final    Mono % 04/18/2023 7.7  4.0 - 15.0 % Final    Eosinophil % 04/18/2023 4.8  0.0 - 8.0 % Final    Basophil % 04/18/2023 0.8  0.0 - 1.9 % Final    Differential Method 04/18/2023 Automated   Final    Sodium 04/18/2023 140  136 - 145 mmol/L Final    Potassium 04/18/2023 3.7  3.5 - 5.1 mmol/L Final    Chloride 04/18/2023 103  95 - 110 mmol/L Final    CO2 04/18/2023 27  23 - 29 mmol/L Final    Glucose 04/18/2023 110  70 - 110 mg/dL Final    BUN 04/18/2023 18  8 - 23 mg/dL Final    Creatinine 04/18/2023 0.7  0.5  - 1.4 mg/dL Final    Calcium 04/18/2023 9.7  8.7 - 10.5 mg/dL Final    Total Protein 04/18/2023 7.5  6.0 - 8.4 g/dL Final    Albumin 04/18/2023 4.3  3.5 - 5.2 g/dL Final    Total Bilirubin 04/18/2023 0.7  0.1 - 1.0 mg/dL Final    Comment: For infants and newborns, interpretation of results should be based  on gestational age, weight and in agreement with clinical  observations.    Premature Infant recommended reference ranges:  Up to 24 hours.............<8.0 mg/dL  Up to 48 hours............<12.0 mg/dL  3-5 days..................<15.0 mg/dL  6-29 days.................<15.0 mg/dL      Alkaline Phosphatase 04/18/2023 249 (H)  55 - 135 U/L Final    AST 04/18/2023 49 (H)  10 - 40 U/L Final    ALT 04/18/2023 126 (H)  10 - 44 U/L Final    Anion Gap 04/18/2023 10  8 - 16 mmol/L Final    eGFR 04/18/2023 >60.0  >60 mL/min/1.73 m^2 Final   Lab Visit on 04/18/2023   Component Date Value Ref Range Status    Specimen UA 04/18/2023 Urine, Clean Catch   Final    Color, UA 04/18/2023 Yellow  Yellow, Straw, Mag Final    Appearance, UA 04/18/2023 Clear  Clear Final    pH, UA 04/18/2023 7.0  5.0 - 8.0 Final    Specific Gravity, UA 04/18/2023 1.020  1.005 - 1.030 Final    Protein, UA 04/18/2023 Negative  Negative Final    Comment: Recommend a 24 hour urine protein or a urine   protein/creatinine ratio if globulin induced proteinuria is  clinically suspected.      Glucose, UA 04/18/2023 Negative  Negative Final    Ketones, UA 04/18/2023 Negative  Negative Final    Bilirubin (UA) 04/18/2023 Negative  Negative Final    Occult Blood UA 04/18/2023 Negative  Negative Final    Nitrite, UA 04/18/2023 Negative  Negative Final    Urobilinogen, UA 04/18/2023 Negative  Negative EU/dL Final    Leukocytes, UA 04/18/2023 Negative  Negative Final    Microalbumin, Urine 04/18/2023 2.0  <19.9 ug/mL Final    Creatinine, Urine 04/18/2023 112.0  15.0 - 325.0 mg/dL Final    Comment: The random urine reference ranges provided were established   for 24  hour urine collections.  No reference ranges exist for  random urine specimens.  Correlate clinically.      Microalb/Creat Ratio 04/18/2023 1.8  0.0 - 30.0 ug/mg Final    Cholesterol 04/18/2023 184  120 - 199 mg/dL Final    Comment: The National Cholesterol Education Program (NCEP) has set the  following guidelines (reference ranges) for Cholesterol:  Optimal.....................<200 mg/dL  Borderline High.............200-239 mg/dL  High........................> or = 240 mg/dL      Triglycerides 04/18/2023 77  30 - 150 mg/dL Final    Comment: The National Cholesterol Education Program (NCEP) has set the  following guidelines (reference values) for triglycerides:  Normal......................<150 mg/dL  Borderline High.............150-199 mg/dL  High........................200-499 mg/dL      HDL 04/18/2023 69  40 - 75 mg/dL Final    Comment: The National Cholesterol Education Program (NCEP) has set the  following guidelines (reference values) for HDL Cholesterol:  Low...............<40 mg/dL  Optimal...........>60 mg/dL      LDL Cholesterol 04/18/2023 99.6  63.0 - 159.0 mg/dL Final    Comment: The National Cholesterol Education Program (NCEP) has set the  following guidelines (reference values) for LDL Cholesterol:  Optimal.......................<130 mg/dL  Borderline High...............130-159 mg/dL  High..........................160-189 mg/dL  Very High.....................>190 mg/dL      HDL/Cholesterol Ratio 04/18/2023 37.5  20.0 - 50.0 % Final    Total Cholesterol/HDL Ratio 04/18/2023 2.7  2.0 - 5.0 Final    Non-HDL Cholesterol 04/18/2023 115  mg/dL Final    Comment: Risk category and Non-HDL cholesterol goals:  Coronary heart disease (CHD)or equivalent (10-year risk of CHD >20%):  Non-HDL cholesterol goal     <130 mg/dL  Two or more CHD risk factors and 10-year risk of CHD <= 20%:  Non-HDL cholesterol goal     <160 mg/dL  0 to 1 CHD risk factor:  Non-HDL cholesterol goal     <190 mg/dL      TSH 04/18/2023 2.830   0.340 - 5.600 uIU/mL Final   Office Visit on 04/17/2023   Component Date Value Ref Range Status    Cholesterol 11/06/2023 166  <200 mg/dL Final    HDL 11/06/2023 65  > OR = 50 mg/dL Final    Triglycerides 11/06/2023 85  <150 mg/dL Final    LDL Cholesterol 11/06/2023 83  mg/dL (calc) Final    Comment: Reference range: <100     Desirable range <100 mg/dL for primary prevention;    <70 mg/dL for patients with CHD or diabetic patients   with > or = 2 CHD risk factors.     LDL-C is now calculated using the Be-Vyas   calculation, which is a validated novel method providing   better accuracy than the Friedewald equation in the   estimation of LDL-C.   Be SS et al. KAREN. 2013;310(19): 8853-1233   (http://education.ditlo/faq/MOH730)      HDL/Cholesterol Ratio 11/06/2023 2.6  <5.0 (calc) Final    Non HDL Chol. (LDL+VLDL) 11/06/2023 101  <130 mg/dL (calc) Final    Comment: For patients with diabetes plus 1 major ASCVD risk   factor, treating to a non-HDL-C goal of <100 mg/dL   (LDL-C of <70 mg/dL) is considered a therapeutic   option.      TSH w/reflex to FT4 11/06/2023 2.42  0.40 - 4.50 mIU/L Final    Creatinine, Urine 11/06/2023 136  20 - 275 mg/dL Final    Microalb, Ur 11/06/2023 0.2  See Note: mg/dL Final    Comment: Reference Range:    Reference Range  Not established      Microalb/Creat Ratio 11/06/2023 1  <30 mcg/mg creat Final    Comment:    The ADA defines abnormalities in albumin  excretion as follows:     Albuminuria Category        Result (mcg/mg creatinine)     Normal to Mildly increased   <30  Moderately increased            Severely increased           > OR = 300     The ADA recommends that at least two of three  specimens collected within a 3-6 month period be  abnormal before considering a patient to be  within a diagnostic category.      Color, UA 11/06/2023 YELLOW  YELLOW Final    Appearance, UA 11/06/2023 CLEAR  CLEAR Final    Specific Gravity, UA 11/06/2023 1.020  1.001 -  1.035 Final    pH, UA 11/06/2023 8.0  5.0 - 8.0 Final    Glucose, UA 11/06/2023 NEGATIVE  NEGATIVE Final    Bilirubin, UA 11/06/2023 NEGATIVE  NEGATIVE Final    Ketones, UA 11/06/2023 NEGATIVE  NEGATIVE Final    Occult Blood UA 11/06/2023 NEGATIVE  NEGATIVE Final    Protein, UA 11/06/2023 NEGATIVE  NEGATIVE Final    Nitrite, UA 11/06/2023 NEGATIVE  NEGATIVE Final    Leukocytes, UA 11/06/2023 TRACE (A)  NEGATIVE Final    WBC Casts, UA 11/06/2023 NONE SEEN  < OR = 5 /HPF Final    RBC Casts, UA 11/06/2023 NONE SEEN  < OR = 2 /HPF Final    Squam Epithel, UA 11/06/2023 NONE SEEN  < OR = 5 /HPF Final    Bacteria, UA 11/06/2023 NONE SEEN  NONE SEEN /HPF Final    Hyaline Casts, UA 11/06/2023 NONE SEEN  NONE SEEN /LPF Final    Service Cmt: 11/06/2023    Final    Comment: This urine was analyzed for the presence of WBC,   RBC, bacteria, casts, and other formed elements.   Only those elements seen were reported.            Reflexive Urine Culture 11/06/2023    Final    CULTURE INDICATED - RESULTS TO FOLLOW    Urine Culture, Routine 11/06/2023    Final    Comment:     CULTURE, URINE, ROUTINE         Micro Number:      [id number]    Test Status:       Final    Specimen Source:   Urine    Specimen Quality:  Adequate    Result:            Mixed genital mauricio isolated. These superficial                       bacteria are not indicative of a urinary tract                       infection. No further organism identification is                       warranted on this specimen. If clinically                       indicated, recollect clean-catch, mid-stream                       urine and transfer immediately to Urine Culture                       Transport Tube.           Past Medical History:   Diagnosis Date    Arthritis     Cancer 2022    DCIS left breast    Hypertension 2022    Macular degeneration     stable    Scoliosis      Past Surgical History:   Procedure Laterality Date    bilateral cataract surgery      BREAST BIOPSY  Bilateral     BREAST CYST ASPIRATION Bilateral      SECTION      MASTECTOMY, PARTIAL Left 2022    Procedure: MASTECTOMY, PARTIAL;  Surgeon: Chanel Calderon MD;  [facility];  Service: General;  Laterality: Left;  LOCALIZER CLIP ON  AT [facility]     Family History   Problem Relation Age of Onset    Cancer Mother     Breast cancer Mother     Breast cancer Maternal Grandmother        Marital Status:   Alcohol History:  reports current alcohol use of about 2.0 - 3.0 standard drinks of alcohol per week.  Tobacco History:  reports that she has never smoked. She has never used smokeless tobacco.  Drug History:  reports no history of drug use.    Review of patient's allergies indicates:   Allergen Reactions    Cheese      Other reaction(s): Headache   BLUE CHEESE       Current Outpatient Medications:     calcium-vitamin D3 (OS-CLOTILDE 500 + D3) 500 mg-5 mcg (200 unit) per tablet, Take 1 tablet by mouth 2 (two) times daily with meals., Disp: , Rfl:     vit C/E/Zn/coppr/lutein/zeaxan (OCUVITE LUTEIN AND ZEAXANTHIN ORAL), Take 2 capsules by mouth once daily., Disp: , Rfl:     atorvastatin (LIPITOR) 10 MG tablet, Take 1 tablet (10 mg total) by mouth once daily., Disp: 90 tablet, Rfl: 3    telmisartan-hydrochlorothiazide (MICARDIS HCT) 40-12.5 mg per tablet, Take 1 tablet by mouth once daily., Disp: 90 tablet, Rfl: 3    Review of Systems   Constitutional:  Negative for appetite change, fatigue, fever and unexpected weight change.   Respiratory:  Negative for cough, chest tightness, shortness of breath and wheezing.    Cardiovascular:  Negative for chest pain and leg swelling.   Gastrointestinal:  Negative for abdominal pain, constipation, nausea and vomiting.        -heartburn   Genitourinary:  Negative for difficulty urinating, dysuria, frequency and urgency.   Musculoskeletal:  Negative for arthralgias, back pain, myalgias and neck pain.   Skin:  Negative for rash.   Neurological:  Negative for dizziness,  "weakness, numbness and headaches.   Hematological:  Does not bruise/bleed easily.   Psychiatric/Behavioral:  Negative for dysphoric mood, sleep disturbance and suicidal ideas. The patient is not nervous/anxious.    All other systems reviewed and are negative.         Objective:      Vitals:    11/08/23 1234 11/08/23 1245   BP: (!) 164/74 138/68   Pulse: 86    Weight: 93.4 kg (206 lb)    Height: 5' 9" (1.753 m)        Body mass index is 30.42 kg/m².   Wt Readings from Last 3 Encounters:   11/08/23 93.4 kg (206 lb)   09/13/23 93.3 kg (205 lb 11.2 oz)   06/13/23 90.7 kg (200 lb)         Physical Exam  Vitals reviewed.   Constitutional:       General: She is not in acute distress.     Appearance: Normal appearance. She is well-developed.      Comments: overweight   HENT:      Head: Normocephalic and atraumatic.   Neck:      Thyroid: No thyromegaly.   Cardiovascular:      Rate and Rhythm: Normal rate and regular rhythm.      Heart sounds: Normal heart sounds. No murmur heard.     No friction rub.   Pulmonary:      Effort: Pulmonary effort is normal.      Breath sounds: Normal breath sounds. No wheezing or rales.   Abdominal:      General: Bowel sounds are normal. There is no distension.      Palpations: Abdomen is soft.      Tenderness: There is no abdominal tenderness.   Musculoskeletal:      Cervical back: Neck supple.   Lymphadenopathy:      Cervical: No cervical adenopathy.   Skin:     General: Skin is warm and dry.      Findings: No rash.   Neurological:      Mental Status: She is alert and oriented to person, place, and time.   Psychiatric:         Attention and Perception: She is attentive.         Speech: Speech normal.         Behavior: Behavior normal.         Thought Content: Thought content normal.         Judgment: Judgment normal.           Assessment:       1. Essential hypertension    2. Mixed hyperlipidemia    3. Swelling of joint of right knee    4. Medication refill           Plan:       Essential " hypertension  Comments:  Controlled. Will continue to monitor BP on current medication regimen  Orders:  -     telmisartan-hydrochlorothiazide (MICARDIS HCT) 40-12.5 mg per tablet; Take 1 tablet by mouth once daily.  Dispense: 90 tablet; Refill: 3    Mixed hyperlipidemia  Comments:  Optimal. LDL 83. To continue lipitor.    Swelling of joint of right knee  Comments:  Asymptomatic. Will continue to monitor symptoms.To see Ortho.  Orders:  -     Ambulatory referral/consult to Orthopedics; Future; Expected date: 11/15/2023    Medication refill  -     atorvastatin (LIPITOR) 10 MG tablet; Take 1 tablet (10 mg total) by mouth once daily.  Dispense: 90 tablet; Refill: 3      Labs and/or tests have been ordered for the evaluation/monitoring of acute/chronic conditions, to be done just before next visit.    Follow up in about 3 months (around 2/8/2024) for HTN, LABS.

## 2024-01-30 ENCOUNTER — LAB VISIT (OUTPATIENT)
Dept: LAB | Facility: HOSPITAL | Age: 69
End: 2024-01-30
Attending: INTERNAL MEDICINE
Payer: OTHER GOVERNMENT

## 2024-01-30 ENCOUNTER — OFFICE VISIT (OUTPATIENT)
Dept: HEMATOLOGY/ONCOLOGY | Facility: CLINIC | Age: 69
End: 2024-01-30
Payer: OTHER GOVERNMENT

## 2024-01-30 VITALS
RESPIRATION RATE: 18 BRPM | HEART RATE: 92 BPM | TEMPERATURE: 98 F | HEIGHT: 69 IN | SYSTOLIC BLOOD PRESSURE: 158 MMHG | WEIGHT: 209 LBS | BODY MASS INDEX: 30.96 KG/M2 | DIASTOLIC BLOOD PRESSURE: 84 MMHG

## 2024-01-30 DIAGNOSIS — C50.112 MALIGNANT NEOPLASM OF CENTRAL PORTION OF LEFT BREAST IN FEMALE, ESTROGEN RECEPTOR POSITIVE: ICD-10-CM

## 2024-01-30 DIAGNOSIS — D05.12 DUCTAL CARCINOMA IN SITU (DCIS) OF LEFT BREAST: Primary | ICD-10-CM

## 2024-01-30 DIAGNOSIS — Z17.0 MALIGNANT NEOPLASM OF CENTRAL PORTION OF LEFT BREAST IN FEMALE, ESTROGEN RECEPTOR POSITIVE: ICD-10-CM

## 2024-01-30 DIAGNOSIS — R74.8 ELEVATED LIVER ENZYMES: ICD-10-CM

## 2024-01-30 LAB
ALBUMIN SERPL BCP-MCNC: 4.3 G/DL (ref 3.5–5.2)
ALP SERPL-CCNC: 145 U/L (ref 55–135)
ALT SERPL W/O P-5'-P-CCNC: 58 U/L (ref 10–44)
ANION GAP SERPL CALC-SCNC: 11 MMOL/L (ref 8–16)
AST SERPL-CCNC: 30 U/L (ref 10–40)
BASOPHILS # BLD AUTO: 0.03 K/UL (ref 0–0.2)
BASOPHILS NFR BLD: 0.6 % (ref 0–1.9)
BILIRUB SERPL-MCNC: 0.7 MG/DL (ref 0.1–1)
BUN SERPL-MCNC: 22 MG/DL (ref 8–23)
CALCIUM SERPL-MCNC: 9.9 MG/DL (ref 8.7–10.5)
CHLORIDE SERPL-SCNC: 102 MMOL/L (ref 95–110)
CO2 SERPL-SCNC: 26 MMOL/L (ref 23–29)
CREAT SERPL-MCNC: 0.8 MG/DL (ref 0.5–1.4)
DIFFERENTIAL METHOD BLD: NORMAL
EOSINOPHIL # BLD AUTO: 0.3 K/UL (ref 0–0.5)
EOSINOPHIL NFR BLD: 5.9 % (ref 0–8)
ERYTHROCYTE [DISTWIDTH] IN BLOOD BY AUTOMATED COUNT: 12.5 % (ref 11.5–14.5)
EST. GFR  (NO RACE VARIABLE): >60 ML/MIN/1.73 M^2
GLUCOSE SERPL-MCNC: 99 MG/DL (ref 70–110)
HCT VFR BLD AUTO: 45.4 % (ref 37–48.5)
HGB BLD-MCNC: 15.2 G/DL (ref 12–16)
IMM GRANULOCYTES # BLD AUTO: 0.01 K/UL (ref 0–0.04)
IMM GRANULOCYTES NFR BLD AUTO: 0.2 % (ref 0–0.5)
LYMPHOCYTES # BLD AUTO: 1.5 K/UL (ref 1–4.8)
LYMPHOCYTES NFR BLD: 30.8 % (ref 18–48)
MCH RBC QN AUTO: 30.3 PG (ref 27–31)
MCHC RBC AUTO-ENTMCNC: 33.5 G/DL (ref 32–36)
MCV RBC AUTO: 91 FL (ref 82–98)
MONOCYTES # BLD AUTO: 0.4 K/UL (ref 0.3–1)
MONOCYTES NFR BLD: 8.1 % (ref 4–15)
NEUTROPHILS # BLD AUTO: 2.7 K/UL (ref 1.8–7.7)
NEUTROPHILS NFR BLD: 54.4 % (ref 38–73)
NRBC BLD-RTO: 0 /100 WBC
PLATELET # BLD AUTO: 258 K/UL (ref 150–450)
PMV BLD AUTO: 11.6 FL (ref 9.2–12.9)
POTASSIUM SERPL-SCNC: 3.7 MMOL/L (ref 3.5–5.1)
PROT SERPL-MCNC: 7.1 G/DL (ref 6–8.4)
RBC # BLD AUTO: 5.01 M/UL (ref 4–5.4)
SODIUM SERPL-SCNC: 139 MMOL/L (ref 136–145)
WBC # BLD AUTO: 4.91 K/UL (ref 3.9–12.7)

## 2024-01-30 PROCEDURE — 85025 COMPLETE CBC W/AUTO DIFF WBC: CPT | Performed by: INTERNAL MEDICINE

## 2024-01-30 PROCEDURE — 99213 OFFICE O/P EST LOW 20 MIN: CPT | Mod: S$GLB,,, | Performed by: INTERNAL MEDICINE

## 2024-01-30 PROCEDURE — 80053 COMPREHEN METABOLIC PANEL: CPT | Performed by: INTERNAL MEDICINE

## 2024-01-30 PROCEDURE — 36415 COLL VENOUS BLD VENIPUNCTURE: CPT | Performed by: INTERNAL MEDICINE

## 2024-01-30 NOTE — ASSESSMENT & PLAN NOTE
Patient is doing well from this standpoint and will continue to monitor her now on four month basis.  Back for exam.  No need for mammograms given bilateral mastectomy.

## 2024-01-30 NOTE — PROGRESS NOTES
PROGRESS NOTE    Subjective:       Patient ID: Naye Gallegos is a 68 y.o. female.    3/23/2022:  Grouped microcalcification in left breast.     3/30/2022:  Left breast microcalcifications at 12 oclock  indeterm masses at 10 oclock at 5 and 6 cm from nipple.   Mixed echogenicity mass at 10 oclock 2 cm from nipple    4/12/2022:  MRI Breast Impression:   1. 17 x 16 x 10 mm irregular non masslike enhancement at 12:00 o'clock position of left breast 5 cm from the nipple correlates with site of recently identified pleomorphic microcalcifications and is suspicious for malignancy.  Previous recommendation for stereotactic biopsy of the microcalcifications is unchanged.  2. Previous indeterminate hypoechoic masses in left breast near 10 o'clock position correlate with foci of fat necrosis, requiring no additional workup or follow-up.  3. Moderate bilateral background parenchymal enhancement, within numerable cysts throughout bilateral breasts, suggesting fibrocystic change.  Recommendation: Stereotactic biopsy of left breast pleomorphic microcalcifications    4/26/2022:  Needle biopsy:  Left breast 5cm from nipple  DCIS  ER: 75%, GA: 75%,    5/24/2022:  Left breast partial mastectomy:   LEFT BREAST, PARTIAL MASTECTOMY:   - MICROINVASIVE CARCINOMA IN THE BACKGROUND OF EXTENSIVE HIGH AND    INTERMEDIATE NUCLEAR   GRADE DUCTAL CARCINOMA IN SITU, CRIBRIFORM, MICROPAPILLARY AND SOLID    AND COMEDOCARCINOMA.   - SURGICAL MARGINS NEGATIVE FOR INVASIVE CARCINOMA.   - DUCTAL CARCINOMA INVOLVES SUPERIOR AND INFERIOR SURGICAL MARGINS (SEE    CASE SUMMARY     FOR FURTHER SURGICAL MARGIN EVALUATION).   - RADIAL SCLEROSING LESION.   - ORGANIZING PREVIOUS BIOPSY SITE.    Receptors on invasive: ER: 20%, GA 0%-    7/18/2022:  Bilateral Mastectomy:  Left: DCIS in far medial breast.  Margins widely clear.  SLN negative  BRCA negative    Right:  Benign.    9/1/2022:  Started  Anastrozole-changed to exemestane due to increased LFTs, LFTs did decline during treatment break then skyler again on exemestane.  Drug discontinued to protect liver.      Plan: single agent AI therapy for five years    Unable to tolerate antiestrogen due to LFT rise, concern for liver disease.     Chief Complaint:  No chief complaint on file.  Breast cancer follow up.     History of Present Illness:   Naye Gallegos is a 68 y.o. female who presents for follow up of breast cancer.      Patient is feeling well with no new complaints at this time.        Family and Social history reviewed and is unchanged from 8/11/2022      ROS:  Review of Systems   Constitutional:  Negative for appetite change, fever and unexpected weight change.   HENT:  Negative for mouth sores.    Eyes:  Negative for visual disturbance.   Respiratory:  Negative for cough and shortness of breath.    Cardiovascular:  Negative for chest pain and leg swelling.   Gastrointestinal:  Negative for abdominal pain, blood in stool and diarrhea.   Genitourinary:  Negative for frequency and hematuria.   Musculoskeletal:  Negative for back pain.   Skin:  Negative for rash.   Neurological:  Negative for headaches.   Hematological:  Negative for adenopathy.   Psychiatric/Behavioral:  The patient is not nervous/anxious.           Current Outpatient Medications:     atorvastatin (LIPITOR) 10 MG tablet, Take 1 tablet (10 mg total) by mouth once daily., Disp: 90 tablet, Rfl: 3    calcium-vitamin D3 (OS-CLOTILDE 500 + D3) 500 mg-5 mcg (200 unit) per tablet, Take 1 tablet by mouth 2 (two) times daily with meals., Disp: , Rfl:     telmisartan-hydrochlorothiazide (MICARDIS HCT) 40-12.5 mg per tablet, Take 1 tablet by mouth once daily., Disp: 90 tablet, Rfl: 3    vit C/E/Zn/coppr/lutein/zeaxan (OCUVITE LUTEIN AND ZEAXANTHIN ORAL), Take 2 capsules by mouth once daily., Disp: , Rfl:         Objective:       Physical Examination:     BP (!) 158/84   Pulse 92   Temp  "97.5 °F (36.4 °C)   Resp 18   Ht 5' 9" (1.753 m)   Wt 94.8 kg (209 lb)   BMI 30.86 kg/m²     Physical Exam  Constitutional:       Appearance: She is well-developed.   HENT:      Head: Normocephalic and atraumatic.      Right Ear: External ear normal.      Left Ear: External ear normal.   Eyes:      Conjunctiva/sclera: Conjunctivae normal.      Pupils: Pupils are equal, round, and reactive to light.   Neck:      Thyroid: No thyromegaly.      Trachea: No tracheal deviation.   Cardiovascular:      Rate and Rhythm: Normal rate and regular rhythm.      Heart sounds: Normal heart sounds.   Pulmonary:      Effort: Pulmonary effort is normal.      Breath sounds: Normal breath sounds.   Chest:       Abdominal:      General: Bowel sounds are normal. There is no distension.      Palpations: Abdomen is soft. There is no mass.      Tenderness: There is no abdominal tenderness.   Skin:     Findings: No rash.   Neurological:      Comments: Neuro intact througout   Psychiatric:         Behavior: Behavior normal.         Thought Content: Thought content normal.         Judgment: Judgment normal.         Labs:   Recent Results (from the past 336 hour(s))   CBC Auto Differential    Collection Time: 01/30/24  9:46 AM   Result Value Ref Range    WBC 4.91 3.90 - 12.70 K/uL    Hemoglobin 15.2 12.0 - 16.0 g/dL    Hematocrit 45.4 37.0 - 48.5 %    Platelets 258 150 - 450 K/uL       CMP  Sodium   Date Value Ref Range Status   01/30/2024 139 136 - 145 mmol/L Final     Potassium   Date Value Ref Range Status   01/30/2024 3.7 3.5 - 5.1 mmol/L Final     Chloride   Date Value Ref Range Status   01/30/2024 102 95 - 110 mmol/L Final     CO2   Date Value Ref Range Status   01/30/2024 26 23 - 29 mmol/L Final     Glucose   Date Value Ref Range Status   01/30/2024 99 70 - 110 mg/dL Final     BUN   Date Value Ref Range Status   01/30/2024 22 8 - 23 mg/dL Final     Creatinine   Date Value Ref Range Status   01/30/2024 0.8 0.5 - 1.4 mg/dL Final " "    Calcium   Date Value Ref Range Status   01/30/2024 9.9 8.7 - 10.5 mg/dL Final     Total Protein   Date Value Ref Range Status   01/30/2024 7.1 6.0 - 8.4 g/dL Final     Albumin   Date Value Ref Range Status   01/30/2024 4.3 3.5 - 5.2 g/dL Final     Total Bilirubin   Date Value Ref Range Status   01/30/2024 0.7 0.1 - 1.0 mg/dL Final     Comment:     For infants and newborns, interpretation of results should be based  on gestational age, weight and in agreement with clinical  observations.    Premature Infant recommended reference ranges:  Up to 24 hours.............<8.0 mg/dL  Up to 48 hours............<12.0 mg/dL  3-5 days..................<15.0 mg/dL  6-29 days.................<15.0 mg/dL       Alkaline Phosphatase   Date Value Ref Range Status   01/30/2024 145 (H) 55 - 135 U/L Final     AST   Date Value Ref Range Status   01/30/2024 30 10 - 40 U/L Final     ALT   Date Value Ref Range Status   01/30/2024 58 (H) 10 - 44 U/L Final     Anion Gap   Date Value Ref Range Status   01/30/2024 11 8 - 16 mmol/L Final     eGFR if    Date Value Ref Range Status   05/19/2022 >60.0 >60 mL/min/1.73 m^2 Final     eGFR if non    Date Value Ref Range Status   05/19/2022 >60.0 >60 mL/min/1.73 m^2 Final     Comment:     Calculation used to obtain the estimated glomerular filtration  rate (eGFR) is the CKD-EPI equation.        No results found for: "CEA"  No results found for: "PSA"        Assessment/Plan:     Problem List Items Addressed This Visit       Elevated liver enzymes     These are nearly normal at this time.  Will continue to monitor.          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  Back for exam.  No need for mammograms given bilateral mastectomy.          Relevant Orders    CBC Auto Differential    Comprehensive " Metabolic Panel     Discussion:     Follow up in about 4 months (around 5/30/2024).      Electronically signed by Lasha Monzon

## 2024-04-19 ENCOUNTER — PATIENT MESSAGE (OUTPATIENT)
Dept: ADMINISTRATIVE | Facility: HOSPITAL | Age: 69
End: 2024-04-19
Payer: OTHER GOVERNMENT

## 2024-05-07 ENCOUNTER — TELEPHONE (OUTPATIENT)
Dept: FAMILY MEDICINE | Facility: CLINIC | Age: 69
End: 2024-05-07
Payer: OTHER GOVERNMENT

## 2024-05-07 DIAGNOSIS — E78.2 MIXED HYPERLIPIDEMIA: ICD-10-CM

## 2024-05-07 DIAGNOSIS — I10 ESSENTIAL HYPERTENSION: ICD-10-CM

## 2024-05-07 DIAGNOSIS — Z79.899 LONG-TERM USE OF HIGH-RISK MEDICATION: ICD-10-CM

## 2024-05-07 DIAGNOSIS — Z00.00 ANNUAL PHYSICAL EXAM: Primary | ICD-10-CM

## 2024-05-08 ENCOUNTER — OFFICE VISIT (OUTPATIENT)
Dept: FAMILY MEDICINE | Facility: CLINIC | Age: 69
End: 2024-05-08
Payer: OTHER GOVERNMENT

## 2024-05-08 VITALS
SYSTOLIC BLOOD PRESSURE: 124 MMHG | WEIGHT: 204 LBS | HEIGHT: 68 IN | HEART RATE: 100 BPM | DIASTOLIC BLOOD PRESSURE: 74 MMHG | BODY MASS INDEX: 30.92 KG/M2

## 2024-05-08 DIAGNOSIS — I10 ESSENTIAL HYPERTENSION: Primary | ICD-10-CM

## 2024-05-08 DIAGNOSIS — E78.2 MIXED HYPERLIPIDEMIA: ICD-10-CM

## 2024-05-08 DIAGNOSIS — Z78.0 MENOPAUSE: ICD-10-CM

## 2024-05-08 LAB
ALBUMIN SERPL-MCNC: 4.2 G/DL (ref 3.6–5.1)
ALBUMIN/CREAT UR: NORMAL MG/G CREAT
ALBUMIN/GLOB SERPL: 1.9 (CALC) (ref 1–2.5)
ALP SERPL-CCNC: 116 U/L (ref 37–153)
ALT SERPL-CCNC: 37 U/L (ref 6–29)
APPEARANCE UR: CLEAR
AST SERPL-CCNC: 28 U/L (ref 10–35)
BACTERIA #/AREA URNS HPF: NORMAL /HPF
BACTERIA UR CULT: NORMAL
BASOPHILS # BLD AUTO: 41 CELLS/UL (ref 0–200)
BASOPHILS NFR BLD AUTO: 0.9 %
BILIRUB SERPL-MCNC: 0.7 MG/DL (ref 0.2–1.2)
BILIRUB UR QL STRIP: NEGATIVE
BUN SERPL-MCNC: 17 MG/DL (ref 7–25)
BUN/CREAT SERPL: ABNORMAL (CALC) (ref 6–22)
CALCIUM SERPL-MCNC: 9.6 MG/DL (ref 8.6–10.4)
CHLORIDE SERPL-SCNC: 105 MMOL/L (ref 98–110)
CHOLEST SERPL-MCNC: 161 MG/DL
CHOLEST/HDLC SERPL: 2.7 (CALC)
CO2 SERPL-SCNC: 26 MMOL/L (ref 20–32)
COLOR UR: YELLOW
CREAT SERPL-MCNC: 0.73 MG/DL (ref 0.5–1.05)
CREAT UR-MCNC: 166 MG/DL (ref 20–275)
EGFR: 90 ML/MIN/1.73M2
EOSINOPHIL # BLD AUTO: 248 CELLS/UL (ref 15–500)
EOSINOPHIL NFR BLD AUTO: 5.4 %
ERYTHROCYTE [DISTWIDTH] IN BLOOD BY AUTOMATED COUNT: 12.5 % (ref 11–15)
GLOBULIN SER CALC-MCNC: 2.2 G/DL (CALC) (ref 1.9–3.7)
GLUCOSE SERPL-MCNC: 97 MG/DL (ref 65–99)
GLUCOSE UR QL STRIP: NEGATIVE
HCT VFR BLD AUTO: 44.8 % (ref 35–45)
HDLC SERPL-MCNC: 60 MG/DL
HGB BLD-MCNC: 14.9 G/DL (ref 11.7–15.5)
HGB UR QL STRIP: NEGATIVE
HYALINE CASTS #/AREA URNS LPF: NORMAL /LPF
KETONES UR QL STRIP: NEGATIVE
LDLC SERPL CALC-MCNC: 87 MG/DL (CALC)
LEUKOCYTE ESTERASE UR QL STRIP: NEGATIVE
LYMPHOCYTES # BLD AUTO: 1385 CELLS/UL (ref 850–3900)
LYMPHOCYTES NFR BLD AUTO: 30.1 %
MCH RBC QN AUTO: 30.9 PG (ref 27–33)
MCHC RBC AUTO-ENTMCNC: 33.3 G/DL (ref 32–36)
MCV RBC AUTO: 92.9 FL (ref 80–100)
MICROALBUMIN UR-MCNC: <0.2 MG/DL
MONOCYTES # BLD AUTO: 423 CELLS/UL (ref 200–950)
MONOCYTES NFR BLD AUTO: 9.2 %
NEUTROPHILS # BLD AUTO: 2502 CELLS/UL (ref 1500–7800)
NEUTROPHILS NFR BLD AUTO: 54.4 %
NITRITE UR QL STRIP: NEGATIVE
NONHDLC SERPL-MCNC: 101 MG/DL (CALC)
PH UR STRIP: 6 [PH] (ref 5–8)
PLATELET # BLD AUTO: 238 THOUSAND/UL (ref 140–400)
PMV BLD REES-ECKER: 12.3 FL (ref 7.5–12.5)
POTASSIUM SERPL-SCNC: 4.1 MMOL/L (ref 3.5–5.3)
PROT SERPL-MCNC: 6.4 G/DL (ref 6.1–8.1)
PROT UR QL STRIP: NEGATIVE
RBC # BLD AUTO: 4.82 MILLION/UL (ref 3.8–5.1)
RBC #/AREA URNS HPF: NORMAL /HPF
SERVICE CMNT-IMP: NORMAL
SODIUM SERPL-SCNC: 139 MMOL/L (ref 135–146)
SP GR UR STRIP: 1.02 (ref 1–1.03)
SQUAMOUS #/AREA URNS HPF: NORMAL /HPF
TRIGL SERPL-MCNC: 56 MG/DL
TSH SERPL-ACNC: 2.54 MIU/L (ref 0.4–4.5)
WBC # BLD AUTO: 4.6 THOUSAND/UL (ref 3.8–10.8)
WBC #/AREA URNS HPF: NORMAL /HPF

## 2024-05-08 PROCEDURE — 99214 OFFICE O/P EST MOD 30 MIN: CPT | Mod: S$GLB,,, | Performed by: FAMILY MEDICINE

## 2024-05-08 RX ORDER — TELMISARTAN AND HYDROCHLORTHIAZIDE 40; 12.5 MG/1; MG/1
1 TABLET ORAL DAILY
Qty: 90 TABLET | Refills: 3 | Status: SHIPPED | OUTPATIENT
Start: 2024-05-08

## 2024-05-08 RX ORDER — ATORVASTATIN CALCIUM 10 MG/1
10 TABLET, FILM COATED ORAL DAILY
Qty: 90 TABLET | Refills: 3 | Status: SHIPPED | OUTPATIENT
Start: 2024-05-08

## 2024-05-08 NOTE — PROGRESS NOTES
SUBJECTIVE:    Patient ID: Naye Gallegos is a 68 y.o. female.    Chief Complaint: Follow-up (6 mo f/u//no med bottles//declined until next year//tc)      Pt here to checkup on acute and chronic conditions.    Seeing (Omari) for hx breast cancer (3/2022), mastectomy, and restorative breast surgery, every 4 months.  No longer on anastrozole due to elevated liver enzymes. (Omari). (No chemo or radiation)    BP is doing ok today.  Denies CP/SOB/HA.     Her right knee is doing better. Had a shot in it (Rodríguez) Back to walking for exercise.      Trying to get rids of her weight. Has cut her carbs. Is very active and works in the yard as well.       Had labs done: LDL 87, fBS 97, TSH 2.54, GFR 90, ALT 37    Has been having some issues with allergies, not taking anything otc.   -------------------------------------------------------------------------------  Pap by Dr. ELEN Lopez, due in May.   Has a hx macular degeneration.  (Bhardwaj). Next appt in Dec.  Taking AREDS 2.  Has not been taking anything for her bones, Nl.   Follows Dr. Loredo (Derm) for full body check.          Telephone on 05/07/2024   Component Date Value Ref Range Status    Creatinine, Urine 05/07/2024 166  20 - 275 mg/dL Final    Microalb, Ur 05/07/2024 <0.2  See Note: mg/dL Final    Comment: Reference Range:    Reference Range  Not established      Microalb/Creat Ratio 05/07/2024 NOTE  <30 mg/g creat Final    Comment: NOTE: The urine albumin value is less than   0.2 mg/dL therefore we are unable to calculate   excretion and/or creatinine ratio.     The ADA defines abnormalities in albumin  excretion as follows:     Albuminuria Category        Result (mg/g creatinine)     Normal to Mildly increased   <30  Moderately increased            Severely increased           > OR = 300     The ADA recommends that at least two of three  specimens collected within a 3-6 month period be  abnormal before considering a patient to be  within a  diagnostic category.      Cholesterol 05/07/2024 161  <200 mg/dL Final    HDL 05/07/2024 60  > OR = 50 mg/dL Final    Triglycerides 05/07/2024 56  <150 mg/dL Final    LDL Cholesterol 05/07/2024 87  mg/dL (calc) Final    Comment: Reference range: <100     Desirable range <100 mg/dL for primary prevention;    <70 mg/dL for patients with CHD or diabetic patients   with > or = 2 CHD risk factors.     LDL-C is now calculated using the NabilVyas   calculation, which is a validated novel method providing   better accuracy than the Friedewald equation in the   estimation of LDL-C.   Be LOPEZ et al. KAREN. 2013;310(19): 4603-9063   (http://[employer]ote[employer]/faq/IHL439)      HDL/Cholesterol Ratio 05/07/2024 2.7  <5.0 (calc) Final    Non HDL Chol. (LDL+VLDL) 05/07/2024 101  <130 mg/dL (calc) Final    Comment: For patients with diabetes plus 1 major ASCVD risk   factor, treating to a non-HDL-C goal of <100 mg/dL   (LDL-C of <70 mg/dL) is considered a therapeutic   option.      Glucose 05/07/2024 97  65 - 99 mg/dL Final    Comment:               Fasting reference interval         BUN 05/07/2024 17  7 - 25 mg/dL Final    Creatinine 05/07/2024 0.73  0.50 - 1.05 mg/dL Final    eGFR 05/07/2024 90  > OR = 60 mL/min/1.73m2 Final    BUN/Creatinine Ratio 05/07/2024 SEE NOTE:  6 - 22 (calc) Final    Comment:    Not Reported: BUN and Creatinine are within     reference range.            Sodium 05/07/2024 139  135 - 146 mmol/L Final    Potassium 05/07/2024 4.1  3.5 - 5.3 mmol/L Final    Chloride 05/07/2024 105  98 - 110 mmol/L Final    CO2 05/07/2024 26  20 - 32 mmol/L Final    Calcium 05/07/2024 9.6  8.6 - 10.4 mg/dL Final    Total Protein 05/07/2024 6.4  6.1 - 8.1 g/dL Final    Albumin 05/07/2024 4.2  3.6 - 5.1 g/dL Final    Globulin, Total 05/07/2024 2.2  1.9 - 3.7 g/dL (calc) Final    Albumin/Globulin Ratio 05/07/2024 1.9  1.0 - 2.5 (calc) Final    Total Bilirubin 05/07/2024 0.7  0.2 - 1.2 mg/dL Final    Alkaline  Phosphatase 05/07/2024 116  37 - 153 U/L Final    AST 05/07/2024 28  10 - 35 U/L Final    ALT 05/07/2024 37 (H)  6 - 29 U/L Final    TSH w/reflex to FT4 05/07/2024 2.54  0.40 - 4.50 mIU/L Final    Color, UA 05/07/2024 YELLOW  YELLOW Final    Appearance, UA 05/07/2024 CLEAR  CLEAR Final    Specific Gravity, UA 05/07/2024 1.023  1.001 - 1.035 Final    pH, UA 05/07/2024 6.0  5.0 - 8.0 Final    Glucose, UA 05/07/2024 NEGATIVE  NEGATIVE Final    Bilirubin, UA 05/07/2024 NEGATIVE  NEGATIVE Final    Ketones, UA 05/07/2024 NEGATIVE  NEGATIVE Final    Occult Blood UA 05/07/2024 NEGATIVE  NEGATIVE Final    Protein, UA 05/07/2024 NEGATIVE  NEGATIVE Final    Nitrite, UA 05/07/2024 NEGATIVE  NEGATIVE Final    Leukocytes, UA 05/07/2024 NEGATIVE  NEGATIVE Final    WBC Casts, UA 05/07/2024 NONE SEEN  < OR = 5 /HPF Final    RBC Casts, UA 05/07/2024 NONE SEEN  < OR = 2 /HPF Final    Squam Epithel, UA 05/07/2024 NONE SEEN  < OR = 5 /HPF Final    Bacteria, UA 05/07/2024 NONE SEEN  NONE SEEN /HPF Final    Hyaline Casts, UA 05/07/2024 NONE SEEN  NONE SEEN /LPF Final    Service Cmt: 05/07/2024    Final    Comment: This urine was analyzed for the presence of WBC,   RBC, bacteria, casts, and other formed elements.   Only those elements seen were reported.            Reflexive Urine Culture 05/07/2024    Final    NO CULTURE INDICATED    WBC 05/07/2024 4.6  3.8 - 10.8 Thousand/uL Final    RBC 05/07/2024 4.82  3.80 - 5.10 Million/uL Final    Hemoglobin 05/07/2024 14.9  11.7 - 15.5 g/dL Final    Hematocrit 05/07/2024 44.8  35.0 - 45.0 % Final    MCV 05/07/2024 92.9  80.0 - 100.0 fL Final    MCH 05/07/2024 30.9  27.0 - 33.0 pg Final    MCHC 05/07/2024 33.3  32.0 - 36.0 g/dL Final    RDW 05/07/2024 12.5  11.0 - 15.0 % Final    Platelets 05/07/2024 238  140 - 400 Thousand/uL Final    MPV 05/07/2024 12.3  7.5 - 12.5 fL Final    Neutrophils, Abs 05/07/2024 2,502  1,500 - 7,800 cells/uL Final    Lymph # 05/07/2024 1,385  850 - 3,900 cells/uL Final     Mono # 05/07/2024 423  200 - 950 cells/uL Final    Eos # 05/07/2024 248  15 - 500 cells/uL Final    Baso # 05/07/2024 41  0 - 200 cells/uL Final    Neutrophils Relative 05/07/2024 54.4  % Final    Lymph % 05/07/2024 30.1  % Final    Mono % 05/07/2024 9.2  % Final    Eosinophil % 05/07/2024 5.4  % Final    Basophil % 05/07/2024 0.9  % Final   Lab Visit on 01/30/2024   Component Date Value Ref Range Status    WBC 01/30/2024 4.91  3.90 - 12.70 K/uL Final    RBC 01/30/2024 5.01  4.00 - 5.40 M/uL Final    Hemoglobin 01/30/2024 15.2  12.0 - 16.0 g/dL Final    Hematocrit 01/30/2024 45.4  37.0 - 48.5 % Final    MCV 01/30/2024 91  82 - 98 fL Final    MCH 01/30/2024 30.3  27.0 - 31.0 pg Final    MCHC 01/30/2024 33.5  32.0 - 36.0 g/dL Final    RDW 01/30/2024 12.5  11.5 - 14.5 % Final    Platelets 01/30/2024 258  150 - 450 K/uL Final    MPV 01/30/2024 11.6  9.2 - 12.9 fL Final    Immature Granulocytes 01/30/2024 0.2  0.0 - 0.5 % Final    Gran # (ANC) 01/30/2024 2.7  1.8 - 7.7 K/uL Final    Immature Grans (Abs) 01/30/2024 0.01  0.00 - 0.04 K/uL Final    Comment: Mild elevation in immature granulocytes is non specific and   can be seen in a variety of conditions including stress response,   acute inflammation, trauma and pregnancy. Correlation with other   laboratory and clinical findings is essential.      Lymph # 01/30/2024 1.5  1.0 - 4.8 K/uL Final    Mono # 01/30/2024 0.4  0.3 - 1.0 K/uL Final    Eos # 01/30/2024 0.3  0.0 - 0.5 K/uL Final    Baso # 01/30/2024 0.03  0.00 - 0.20 K/uL Final    nRBC 01/30/2024 0  0 /100 WBC Final    Gran % 01/30/2024 54.4  38.0 - 73.0 % Final    Lymph % 01/30/2024 30.8  18.0 - 48.0 % Final    Mono % 01/30/2024 8.1  4.0 - 15.0 % Final    Eosinophil % 01/30/2024 5.9  0.0 - 8.0 % Final    Basophil % 01/30/2024 0.6  0.0 - 1.9 % Final    Differential Method 01/30/2024 Automated   Final    Sodium 01/30/2024 139  136 - 145 mmol/L Final    Potassium 01/30/2024 3.7  3.5 - 5.1 mmol/L Final    Chloride  2024 102  95 - 110 mmol/L Final    CO2 2024 26  23 - 29 mmol/L Final    Glucose 2024 99  70 - 110 mg/dL Final    BUN 2024 22  8 - 23 mg/dL Final    Creatinine 2024 0.8  0.5 - 1.4 mg/dL Final    Calcium 2024 9.9  8.7 - 10.5 mg/dL Final    Total Protein 2024 7.1  6.0 - 8.4 g/dL Final    Albumin 2024 4.3  3.5 - 5.2 g/dL Final    Total Bilirubin 2024 0.7  0.1 - 1.0 mg/dL Final    Comment: For infants and newborns, interpretation of results should be based  on gestational age, weight and in agreement with clinical  observations.    Premature Infant recommended reference ranges:  Up to 24 hours.............<8.0 mg/dL  Up to 48 hours............<12.0 mg/dL  3-5 days..................<15.0 mg/dL  6-29 days.................<15.0 mg/dL      Alkaline Phosphatase 2024 145 (H)  55 - 135 U/L Final    AST 2024 30  10 - 40 U/L Final    ALT 2024 58 (H)  10 - 44 U/L Final    eGFR 2024 >60.0  >60 mL/min/1.73 m^2 Final    Anion Gap 2024 11  8 - 16 mmol/L Final         Past Medical History:   Diagnosis Date    Arthritis     Cancer     DCIS left breast    Hypertension     Macular degeneration     stable    Scoliosis      Past Surgical History:   Procedure Laterality Date    bilateral cataract surgery      BILATERAL MASTECTOMY  2022    BREAST BIOPSY Bilateral     BREAST CYST ASPIRATION Bilateral      SECTION      MASTECTOMY, PARTIAL Left 2022    Procedure: MASTECTOMY, PARTIAL;  Surgeon: Chanel Calderon MD;  Location: [facility];  Service: General;  Laterality: Left;  LOCALIZER CLIP ON  AT [facility]     Family History   Problem Relation Name Age of Onset    Cancer Mother      Breast cancer Mother      Breast cancer Maternal Grandmother         Marital Status:   Alcohol History:  reports current alcohol use of about 2.0 - 3.0 standard drinks of alcohol per week.  Tobacco History:  reports that she has never smoked. She has never  used smokeless tobacco.  Drug History:  reports no history of drug use.    Review of patient's allergies indicates:   Allergen Reactions    Cheese      Other reaction(s): Headache   BLUE CHEESE       Current Outpatient Medications:     calcium-vitamin D3 (OS-CLOTILDE 500 + D3) 500 mg-5 mcg (200 unit) per tablet, Take 1 tablet by mouth 2 (two) times daily with meals., Disp: , Rfl:     vit C/E/Zn/coppr/lutein/zeaxan (OCUVITE LUTEIN AND ZEAXANTHIN ORAL), Take 2 capsules by mouth once daily., Disp: , Rfl:     atorvastatin (LIPITOR) 10 MG tablet, Take 1 tablet (10 mg total) by mouth once daily., Disp: 90 tablet, Rfl: 3    telmisartan-hydrochlorothiazide (MICARDIS HCT) 40-12.5 mg per tablet, Take 1 tablet by mouth once daily., Disp: 90 tablet, Rfl: 3    Review of Systems   Constitutional:  Negative for activity change, appetite change, fatigue, fever and unexpected weight change.   HENT:  Negative for hearing loss, rhinorrhea and trouble swallowing.    Eyes:  Negative for discharge and visual disturbance.   Respiratory:  Negative for cough, chest tightness, shortness of breath and wheezing.    Cardiovascular:  Negative for chest pain, palpitations and leg swelling.   Gastrointestinal:  Negative for abdominal pain, blood in stool, constipation, diarrhea, nausea and vomiting.        -heartburn   Endocrine: Negative for polydipsia and polyuria.   Genitourinary:  Negative for difficulty urinating, dysuria, frequency, hematuria, menstrual problem and urgency.   Musculoskeletal:  Negative for arthralgias, back pain, joint swelling, myalgias and neck pain.   Skin:  Negative for rash.   Neurological:  Negative for dizziness, weakness, numbness and headaches.   Hematological:  Does not bruise/bleed easily.   Psychiatric/Behavioral:  Negative for confusion, dysphoric mood, sleep disturbance and suicidal ideas. The patient is not nervous/anxious.    All other systems reviewed and are negative.         Objective:      Vitals:    05/08/24  "1139   BP: 124/74   Pulse: 100   Weight: 92.5 kg (204 lb)   Height: 5' 7.5" (1.715 m)         Body mass index is 31.48 kg/m².   Wt Readings from Last 3 Encounters:   05/08/24 92.5 kg (204 lb)   01/30/24 94.8 kg (209 lb)   11/08/23 93.4 kg (206 lb)         Physical Exam  Vitals reviewed.   Constitutional:       General: She is not in acute distress.     Appearance: Normal appearance. She is well-developed.      Comments: overweight   HENT:      Head: Normocephalic and atraumatic.   Neck:      Thyroid: No thyromegaly.   Cardiovascular:      Rate and Rhythm: Normal rate and regular rhythm.      Heart sounds: Normal heart sounds. No murmur heard.     No friction rub.   Pulmonary:      Effort: Pulmonary effort is normal.      Breath sounds: Normal breath sounds. No wheezing or rales.   Abdominal:      General: Bowel sounds are normal. There is no distension.      Palpations: Abdomen is soft.      Tenderness: There is no abdominal tenderness.   Musculoskeletal:      Cervical back: Neck supple.   Lymphadenopathy:      Cervical: No cervical adenopathy.   Skin:     General: Skin is warm and dry.      Findings: No rash.   Neurological:      Mental Status: She is alert and oriented to person, place, and time.   Psychiatric:         Attention and Perception: She is attentive.         Speech: Speech normal.         Behavior: Behavior normal.         Thought Content: Thought content normal.         Judgment: Judgment normal.           Assessment:       1. Essential hypertension    2. Mixed hyperlipidemia             Plan:       Essential hypertension  Comments:  Controlled. Will continue to monitor BP on current medication regimen  Orders:  -     telmisartan-hydrochlorothiazide (MICARDIS HCT) 40-12.5 mg per tablet; Take 1 tablet by mouth once daily.  Dispense: 90 tablet; Refill: 3    Mixed hyperlipidemia  Comments:  Controlled. LDL 87. To continue statin  Orders:  -     atorvastatin (LIPITOR) 10 MG tablet; Take 1 tablet (10 mg " total) by mouth once daily.  Dispense: 90 tablet; Refill: 3      Other  Lab results discussed and reviewed with patient.    Follow up in about 6 months (around 11/8/2024) for HTN, HLD, LABS.

## 2024-05-13 ENCOUNTER — PATIENT MESSAGE (OUTPATIENT)
Dept: ADMINISTRATIVE | Facility: HOSPITAL | Age: 69
End: 2024-05-13
Payer: OTHER GOVERNMENT

## 2024-05-14 ENCOUNTER — HOSPITAL ENCOUNTER (OUTPATIENT)
Dept: RADIOLOGY | Facility: CLINIC | Age: 69
Discharge: HOME | End: 2024-05-14
Attending: FAMILY MEDICINE
Payer: OTHER GOVERNMENT

## 2024-05-14 DIAGNOSIS — Z78.0 MENOPAUSE: ICD-10-CM

## 2024-05-15 ENCOUNTER — HOSPITAL ENCOUNTER (OUTPATIENT)
Dept: RADIOLOGY | Facility: HOSPITAL | Age: 69
Discharge: HOME | End: 2024-05-15
Attending: FAMILY MEDICINE
Payer: OTHER GOVERNMENT

## 2024-05-15 PROCEDURE — 77080 DXA BONE DENSITY AXIAL: CPT | Mod: TC,PO

## 2024-05-15 PROCEDURE — 77080 DXA BONE DENSITY AXIAL: CPT | Mod: 26,,, | Performed by: RADIOLOGY

## 2024-05-16 ENCOUNTER — TELEPHONE (OUTPATIENT)
Facility: CLINIC | Age: 69
End: 2024-05-16
Payer: OTHER GOVERNMENT

## 2024-05-16 NOTE — TELEPHONE ENCOUNTER
Made contact with patient to remind her of lab work due for upcoming appointment. Patient advised she just went for her labs. Patient verbalized understanding.

## 2024-05-21 ENCOUNTER — OFFICE VISIT (OUTPATIENT)
Facility: CLINIC | Age: 69
End: 2024-05-21
Payer: OTHER GOVERNMENT

## 2024-05-21 VITALS
SYSTOLIC BLOOD PRESSURE: 146 MMHG | WEIGHT: 205.69 LBS | TEMPERATURE: 98 F | DIASTOLIC BLOOD PRESSURE: 65 MMHG | HEART RATE: 89 BPM | BODY MASS INDEX: 31.74 KG/M2

## 2024-05-21 DIAGNOSIS — C50.112 MALIGNANT NEOPLASM OF CENTRAL PORTION OF LEFT BREAST IN FEMALE, ESTROGEN RECEPTOR POSITIVE: Primary | ICD-10-CM

## 2024-05-21 DIAGNOSIS — Z17.0 MALIGNANT NEOPLASM OF CENTRAL PORTION OF LEFT BREAST IN FEMALE, ESTROGEN RECEPTOR POSITIVE: Primary | ICD-10-CM

## 2024-05-21 DIAGNOSIS — R74.8 ELEVATED LIVER ENZYMES: ICD-10-CM

## 2024-05-21 PROCEDURE — 99213 OFFICE O/P EST LOW 20 MIN: CPT | Mod: S$GLB,,, | Performed by: INTERNAL MEDICINE

## 2024-05-21 PROCEDURE — 99999 PR PBB SHADOW E&M-EST. PATIENT-LVL III: CPT | Mod: PBBFAC,,, | Performed by: INTERNAL MEDICINE

## 2024-05-21 NOTE — PROGRESS NOTES
PROGRESS NOTE    Subjective:       Patient ID: Naye Gallegos is a 68 y.o. female.    3/23/2022:  Grouped microcalcification in left breast.     3/30/2022:  Left breast microcalcifications at 12 oclock  indeterm masses at 10 oclock at 5 and 6 cm from nipple.   Mixed echogenicity mass at 10 oclock 2 cm from nipple    4/12/2022:  MRI Breast Impression:   1. 17 x 16 x 10 mm irregular non masslike enhancement at 12:00 o'clock position of left breast 5 cm from the nipple correlates with site of recently identified pleomorphic microcalcifications and is suspicious for malignancy.  Previous recommendation for stereotactic biopsy of the microcalcifications is unchanged.  2. Previous indeterminate hypoechoic masses in left breast near 10 o'clock position correlate with foci of fat necrosis, requiring no additional workup or follow-up.  3. Moderate bilateral background parenchymal enhancement, within numerable cysts throughout bilateral breasts, suggesting fibrocystic change.  Recommendation: Stereotactic biopsy of left breast pleomorphic microcalcifications    4/26/2022:  Needle biopsy:  Left breast 5cm from nipple  DCIS  ER: 75%, TX: 75%,    5/24/2022:  Left breast partial mastectomy:   LEFT BREAST, PARTIAL MASTECTOMY:   - MICROINVASIVE CARCINOMA IN THE BACKGROUND OF EXTENSIVE HIGH AND    INTERMEDIATE NUCLEAR   GRADE DUCTAL CARCINOMA IN SITU, CRIBRIFORM, MICROPAPILLARY AND SOLID    AND COMEDOCARCINOMA.   - SURGICAL MARGINS NEGATIVE FOR INVASIVE CARCINOMA.   - DUCTAL CARCINOMA INVOLVES SUPERIOR AND INFERIOR SURGICAL MARGINS (SEE    CASE SUMMARY     FOR FURTHER SURGICAL MARGIN EVALUATION).   - RADIAL SCLEROSING LESION.   - ORGANIZING PREVIOUS BIOPSY SITE.    Receptors on invasive: ER: 20%, TX 0%-    7/18/2022:  Bilateral Mastectomy:  Left: DCIS in far medial breast.  Margins widely clear.  SLN negative  BRCA negative    Right:  Benign.    9/1/2022:  Started  Anastrozole-changed to exemestane due to increased LFTs, LFTs did decline during treatment break then skyler again on exemestane.  Drug discontinued to protect liver.      Plan: single agent AI therapy for five years    Unable to tolerate antiestrogen due to LFT rise, concern for liver disease.     Chief Complaint:  No chief complaint on file.  Breast cancer follow up.     History of Present Illness:   Naye Gallegos is a 68 y.o. female who presents for follow up of breast cancer.      Patient is feeling well with no new complaints at this time.        Family and Social history reviewed and is unchanged from 8/11/2022      ROS:  Review of Systems   Constitutional:  Negative for appetite change, fever and unexpected weight change.   HENT:  Negative for mouth sores.    Eyes:  Negative for visual disturbance.   Respiratory:  Negative for cough and shortness of breath.    Cardiovascular:  Negative for chest pain and leg swelling.   Gastrointestinal:  Negative for abdominal pain, blood in stool and diarrhea.   Genitourinary:  Negative for frequency and hematuria.   Musculoskeletal:  Negative for back pain.   Skin:  Negative for rash.   Neurological:  Negative for headaches.   Hematological:  Negative for adenopathy.   Psychiatric/Behavioral:  The patient is not nervous/anxious.           Current Outpatient Medications:     atorvastatin (LIPITOR) 10 MG tablet, Take 1 tablet (10 mg total) by mouth once daily., Disp: 90 tablet, Rfl: 3    calcium-vitamin D3 (OS-CLOTILDE 500 + D3) 500 mg-5 mcg (200 unit) per tablet, Take 1 tablet by mouth 2 (two) times daily with meals., Disp: , Rfl:     telmisartan-hydrochlorothiazide (MICARDIS HCT) 40-12.5 mg per tablet, Take 1 tablet by mouth once daily., Disp: 90 tablet, Rfl: 3    vit C/E/Zn/coppr/lutein/zeaxan (OCUVITE LUTEIN AND ZEAXANTHIN ORAL), Take 2 capsules by mouth once daily., Disp: , Rfl:         Objective:       Physical Examination:     BP (!) 146/65 (BP Location: Right  arm, Patient Position: Sitting, BP Method: Large (Automatic))   Pulse 89   Temp 98.1 °F (36.7 °C)   Wt 93.3 kg (205 lb 11.2 oz)   BMI 31.74 kg/m²     Physical Exam  Constitutional:       Appearance: She is well-developed.   HENT:      Head: Normocephalic and atraumatic.      Right Ear: External ear normal.      Left Ear: External ear normal.   Eyes:      Conjunctiva/sclera: Conjunctivae normal.      Pupils: Pupils are equal, round, and reactive to light.   Neck:      Thyroid: No thyromegaly.      Trachea: No tracheal deviation.   Cardiovascular:      Rate and Rhythm: Normal rate and regular rhythm.      Heart sounds: Normal heart sounds.   Pulmonary:      Effort: Pulmonary effort is normal.      Breath sounds: Normal breath sounds.   Chest:       Abdominal:      General: Bowel sounds are normal. There is no distension.      Palpations: Abdomen is soft. There is no mass.      Tenderness: There is no abdominal tenderness.   Skin:     Findings: No rash.   Neurological:      Comments: Neuro intact througout   Psychiatric:         Behavior: Behavior normal.         Thought Content: Thought content normal.         Judgment: Judgment normal.         Labs:   No results found for this or any previous visit (from the past 336 hour(s)).      CMP  Sodium   Date Value Ref Range Status   05/07/2024 139 135 - 146 mmol/L Final     Potassium   Date Value Ref Range Status   05/07/2024 4.1 3.5 - 5.3 mmol/L Final     Chloride   Date Value Ref Range Status   05/07/2024 105 98 - 110 mmol/L Final     CO2   Date Value Ref Range Status   05/07/2024 26 20 - 32 mmol/L Final     Glucose   Date Value Ref Range Status   05/07/2024 97 65 - 99 mg/dL Final     Comment:                   Fasting reference interval          BUN   Date Value Ref Range Status   05/07/2024 17 7 - 25 mg/dL Final     Creatinine   Date Value Ref Range Status   05/07/2024 0.73 0.50 - 1.05 mg/dL Final     Calcium   Date Value Ref Range Status   05/07/2024 9.6 8.6 - 10.4  "mg/dL Final     Total Protein   Date Value Ref Range Status   05/07/2024 6.4 6.1 - 8.1 g/dL Final     Albumin   Date Value Ref Range Status   05/07/2024 4.2 3.6 - 5.1 g/dL Final     Total Bilirubin   Date Value Ref Range Status   05/07/2024 0.7 0.2 - 1.2 mg/dL Final     Alkaline Phosphatase   Date Value Ref Range Status   01/30/2024 145 (H) 55 - 135 U/L Final     AST   Date Value Ref Range Status   05/07/2024 28 10 - 35 U/L Final     ALT   Date Value Ref Range Status   05/07/2024 37 (H) 6 - 29 U/L Final     Anion Gap   Date Value Ref Range Status   01/30/2024 11 8 - 16 mmol/L Final     eGFR if    Date Value Ref Range Status   05/19/2022 >60.0 >60 mL/min/1.73 m^2 Final     eGFR if non    Date Value Ref Range Status   05/19/2022 >60.0 >60 mL/min/1.73 m^2 Final     Comment:     Calculation used to obtain the estimated glomerular filtration  rate (eGFR) is the CKD-EPI equation.        No results found for: "CEA"  No results found for: "PSA"        Assessment/Plan:     Problem List Items Addressed This Visit       Microinvasive Left Breast Cancer - Primary     Patient is doing well and appears CHASE.  Will continue to see her now on a six month basis and discussed this today.          Relevant Orders    CBC Auto Differential    Comprehensive Metabolic Panel    Elevated liver enzymes     Counts are nearly normal now and she is off therapy.  Will continue conservative monitoring for now.  No new issues.          Relevant Orders    Comprehensive Metabolic Panel       Discussion:     Follow up in about 6 months (around 11/21/2024).      Electronically signed by Lsaha Monzon          "

## 2024-05-21 NOTE — ASSESSMENT & PLAN NOTE
Patient is doing well and appears CHASE.  Will continue to see her now on a six month basis and discussed this today.

## 2024-05-21 NOTE — ASSESSMENT & PLAN NOTE
Counts are nearly normal now and she is off therapy.  Will continue conservative monitoring for now.  No new issues.

## 2024-10-30 ENCOUNTER — TELEPHONE (OUTPATIENT)
Dept: FAMILY MEDICINE | Facility: CLINIC | Age: 69
End: 2024-10-30
Payer: OTHER GOVERNMENT

## 2024-11-12 ENCOUNTER — LAB VISIT (OUTPATIENT)
Dept: LAB | Facility: HOSPITAL | Age: 69
End: 2024-11-12
Attending: INTERNAL MEDICINE
Payer: OTHER GOVERNMENT

## 2024-11-12 ENCOUNTER — OFFICE VISIT (OUTPATIENT)
Facility: CLINIC | Age: 69
End: 2024-11-12
Payer: OTHER GOVERNMENT

## 2024-11-12 VITALS
WEIGHT: 200.63 LBS | SYSTOLIC BLOOD PRESSURE: 158 MMHG | TEMPERATURE: 97 F | RESPIRATION RATE: 16 BRPM | BODY MASS INDEX: 30.95 KG/M2 | HEART RATE: 70 BPM | DIASTOLIC BLOOD PRESSURE: 77 MMHG

## 2024-11-12 DIAGNOSIS — C50.112 MALIGNANT NEOPLASM OF CENTRAL PORTION OF LEFT BREAST IN FEMALE, ESTROGEN RECEPTOR POSITIVE: Primary | ICD-10-CM

## 2024-11-12 DIAGNOSIS — Z17.0 MALIGNANT NEOPLASM OF CENTRAL PORTION OF LEFT BREAST IN FEMALE, ESTROGEN RECEPTOR POSITIVE: ICD-10-CM

## 2024-11-12 DIAGNOSIS — C50.112 MALIGNANT NEOPLASM OF CENTRAL PORTION OF LEFT BREAST IN FEMALE, ESTROGEN RECEPTOR POSITIVE: ICD-10-CM

## 2024-11-12 DIAGNOSIS — R74.8 ELEVATED LIVER ENZYMES: ICD-10-CM

## 2024-11-12 DIAGNOSIS — Z17.0 MALIGNANT NEOPLASM OF CENTRAL PORTION OF LEFT BREAST IN FEMALE, ESTROGEN RECEPTOR POSITIVE: Primary | ICD-10-CM

## 2024-11-12 LAB
ALBUMIN SERPL BCP-MCNC: 4.3 G/DL (ref 3.5–5.2)
ALP SERPL-CCNC: 131 U/L (ref 55–135)
ALT SERPL W/O P-5'-P-CCNC: 71 U/L (ref 10–44)
ANION GAP SERPL CALC-SCNC: 7 MMOL/L (ref 8–16)
AST SERPL-CCNC: 31 U/L (ref 10–40)
BASOPHILS # BLD AUTO: 0.03 K/UL (ref 0–0.2)
BASOPHILS NFR BLD: 0.7 % (ref 0–1.9)
BILIRUB SERPL-MCNC: 0.8 MG/DL (ref 0.1–1)
BUN SERPL-MCNC: 20 MG/DL (ref 8–23)
CALCIUM SERPL-MCNC: 9.8 MG/DL (ref 8.7–10.5)
CHLORIDE SERPL-SCNC: 104 MMOL/L (ref 95–110)
CO2 SERPL-SCNC: 27 MMOL/L (ref 23–29)
CREAT SERPL-MCNC: 0.8 MG/DL (ref 0.5–1.4)
DIFFERENTIAL METHOD BLD: NORMAL
EOSINOPHIL # BLD AUTO: 0.3 K/UL (ref 0–0.5)
EOSINOPHIL NFR BLD: 5.8 % (ref 0–8)
ERYTHROCYTE [DISTWIDTH] IN BLOOD BY AUTOMATED COUNT: 13 % (ref 11.5–14.5)
EST. GFR  (NO RACE VARIABLE): >60 ML/MIN/1.73 M^2
GLUCOSE SERPL-MCNC: 94 MG/DL (ref 70–110)
HCT VFR BLD AUTO: 43.8 % (ref 37–48.5)
HGB BLD-MCNC: 14.5 G/DL (ref 12–16)
IMM GRANULOCYTES # BLD AUTO: 0.01 K/UL (ref 0–0.04)
IMM GRANULOCYTES NFR BLD AUTO: 0.2 % (ref 0–0.5)
LYMPHOCYTES # BLD AUTO: 1.5 K/UL (ref 1–4.8)
LYMPHOCYTES NFR BLD: 32.2 % (ref 18–48)
MCH RBC QN AUTO: 30 PG (ref 27–31)
MCHC RBC AUTO-ENTMCNC: 33.1 G/DL (ref 32–36)
MCV RBC AUTO: 91 FL (ref 82–98)
MONOCYTES # BLD AUTO: 0.4 K/UL (ref 0.3–1)
MONOCYTES NFR BLD: 8 % (ref 4–15)
NEUTROPHILS # BLD AUTO: 2.4 K/UL (ref 1.8–7.7)
NEUTROPHILS NFR BLD: 53.1 % (ref 38–73)
NRBC BLD-RTO: 0 /100 WBC
PLATELET # BLD AUTO: 241 K/UL (ref 150–450)
PMV BLD AUTO: 11.1 FL (ref 9.2–12.9)
POTASSIUM SERPL-SCNC: 4 MMOL/L (ref 3.5–5.1)
PROT SERPL-MCNC: 6.9 G/DL (ref 6–8.4)
RBC # BLD AUTO: 4.83 M/UL (ref 4–5.4)
SODIUM SERPL-SCNC: 138 MMOL/L (ref 136–145)
WBC # BLD AUTO: 4.5 K/UL (ref 3.9–12.7)

## 2024-11-12 PROCEDURE — 99213 OFFICE O/P EST LOW 20 MIN: CPT | Mod: S$GLB,,, | Performed by: INTERNAL MEDICINE

## 2024-11-12 PROCEDURE — 85025 COMPLETE CBC W/AUTO DIFF WBC: CPT | Performed by: INTERNAL MEDICINE

## 2024-11-12 PROCEDURE — 99999 PR PBB SHADOW E&M-EST. PATIENT-LVL III: CPT | Mod: PBBFAC,,, | Performed by: INTERNAL MEDICINE

## 2024-11-12 PROCEDURE — 36415 COLL VENOUS BLD VENIPUNCTURE: CPT | Performed by: INTERNAL MEDICINE

## 2024-11-12 PROCEDURE — 80053 COMPREHEN METABOLIC PANEL: CPT | Performed by: INTERNAL MEDICINE

## 2024-11-12 PROCEDURE — G2211 COMPLEX E/M VISIT ADD ON: HCPCS | Mod: S$GLB,,, | Performed by: INTERNAL MEDICINE

## 2024-11-12 NOTE — ASSESSMENT & PLAN NOTE
Patient is doing well.  She has no sign of recurrence and appears CHASE at this time.  She did not tolerate the AI but note is made of very small and low-grade disease process.  Will continue to see her on a six month basis.

## 2024-11-12 NOTE — PROGRESS NOTES
PROGRESS NOTE    Subjective:       Patient ID: Naye Gallegos is a 68 y.o. female.    3/23/2022:  Grouped microcalcification in left breast.     3/30/2022:  Left breast microcalcifications at 12 oclock  indeterm masses at 10 oclock at 5 and 6 cm from nipple.   Mixed echogenicity mass at 10 oclock 2 cm from nipple    4/12/2022:  MRI Breast Impression:   1. 17 x 16 x 10 mm irregular non masslike enhancement at 12:00 o'clock position of left breast 5 cm from the nipple correlates with site of recently identified pleomorphic microcalcifications and is suspicious for malignancy.  Previous recommendation for stereotactic biopsy of the microcalcifications is unchanged.  2. Previous indeterminate hypoechoic masses in left breast near 10 o'clock position correlate with foci of fat necrosis, requiring no additional workup or follow-up.  3. Moderate bilateral background parenchymal enhancement, within numerable cysts throughout bilateral breasts, suggesting fibrocystic change.  Recommendation: Stereotactic biopsy of left breast pleomorphic microcalcifications    4/26/2022:  Needle biopsy:  Left breast 5cm from nipple  DCIS  ER: 75%, [place]: 75%,    5/24/2022:  Left breast partial mastectomy:   LEFT BREAST, PARTIAL MASTECTOMY:   - MICROINVASIVE CARCINOMA IN THE BACKGROUND OF EXTENSIVE HIGH AND    INTERMEDIATE NUCLEAR   GRADE DUCTAL CARCINOMA IN SITU, CRIBRIFORM, MICROPAPILLARY AND SOLID    AND COMEDOCARCINOMA.   - SURGICAL MARGINS NEGATIVE FOR INVASIVE CARCINOMA.   - DUCTAL CARCINOMA INVOLVES SUPERIOR AND INFERIOR SURGICAL MARGINS (SEE    CASE SUMMARY     FOR FURTHER SURGICAL MARGIN EVALUATION).   - RADIAL SCLEROSING LESION.   - ORGANIZING PREVIOUS BIOPSY SITE.    Receptors on invasive: ER: 20%, [place] 0%-    7/18/2022:  Bilateral Mastectomy:  Left: DCIS in far medial breast.  Margins widely clear.  SLN negative  BRCA negative    Right:  Benign.    9/1/2022:  Started  Anastrozole-changed to exemestane due to increased LFTs, LFTs did decline during treatment break then skyler again on exemestane.  Drug discontinued to protect liver.      Plan: single agent AI therapy for five years    Unable to tolerate antiestrogen due to LFT rise, concern for liver disease.     Chief Complaint:  No chief complaint on file.  Microinvasive Breast cancer with DCIS follow up.     History of Present Illness:   Naye Gallegos is a 68 y.o. female who presents for follow up of breast cancer.      Patient is feeling well with no new complaints at this time.        Family and Social history reviewed and is unchanged from 8/11/2022      ROS:  Review of Systems   Constitutional:  Negative for appetite change, fever and unexpected weight change.   HENT:  Negative for mouth sores.    Eyes:  Negative for visual disturbance.   Respiratory:  Negative for cough and shortness of breath.    Cardiovascular:  Negative for chest pain and leg swelling.   Gastrointestinal:  Negative for abdominal pain, blood in stool and diarrhea.   Genitourinary:  Negative for frequency and hematuria.   Musculoskeletal:  Negative for back pain.   Skin:  Negative for rash.   Neurological:  Negative for headaches.   Hematological:  Negative for adenopathy.   Psychiatric/Behavioral:  The patient is not nervous/anxious.           Current Outpatient Medications:     atorvastatin (LIPITOR) 10 MG tablet, Take 1 tablet (10 mg total) by mouth once daily., Disp: 90 tablet, Rfl: 3    calcium-vitamin D3 (OS-CLOTILDE 500 + D3) 500 mg-5 mcg (200 unit) per tablet, Take 1 tablet by mouth 2 (two) times daily with meals., Disp: , Rfl:     telmisartan-hydrochlorothiazide (MICARDIS HCT) 40-12.5 mg per tablet, Take 1 tablet by mouth once daily., Disp: 90 tablet, Rfl: 3    vit C/E/Zn/coppr/lutein/zeaxan (OCUVITE LUTEIN AND ZEAXANTHIN ORAL), Take 2 capsules by mouth once daily., Disp: , Rfl:         Objective:       Physical Examination:     BP (!) 158/77    Pulse 70   Temp 97.1 °F (36.2 °C)   Resp 16   Wt 91 kg (200 lb 9.6 oz)   BMI 30.95 kg/m²     Physical Exam  Constitutional:       Appearance: She is well-developed.   HENT:      Head: Normocephalic and atraumatic.      Right Ear: External ear normal.      Left Ear: External ear normal.   Eyes:      Conjunctiva/sclera: Conjunctivae normal.      Pupils: Pupils are equal, round, and reactive to light.   Neck:      Thyroid: No thyromegaly.      Trachea: No tracheal deviation.   Cardiovascular:      Rate and Rhythm: Normal rate and regular rhythm.      Heart sounds: Normal heart sounds.   Pulmonary:      Effort: Pulmonary effort is normal.      Breath sounds: Normal breath sounds.   Chest:       Abdominal:      General: Bowel sounds are normal. There is no distension.      Palpations: Abdomen is soft. There is no mass.      Tenderness: There is no abdominal tenderness.   Skin:     Findings: No rash.   Neurological:      Comments: Neuro intact througout   Psychiatric:         Behavior: Behavior normal.         Thought Content: Thought content normal.         Judgment: Judgment normal.         Labs:   Recent Results (from the past 2 weeks)   CBC Auto Differential    Collection Time: 11/12/24  8:33 AM   Result Value Ref Range    WBC 4.50 3.90 - 12.70 K/uL    Hemoglobin 14.5 12.0 - 16.0 g/dL    Hematocrit 43.8 37.0 - 48.5 %    Platelets 241 150 - 450 K/uL         CMP  Sodium   Date Value Ref Range Status   11/12/2024 138 136 - 145 mmol/L Final     Potassium   Date Value Ref Range Status   11/12/2024 4.0 3.5 - 5.1 mmol/L Final     Chloride   Date Value Ref Range Status   11/12/2024 104 95 - 110 mmol/L Final     CO2   Date Value Ref Range Status   11/12/2024 27 23 - 29 mmol/L Final     Glucose   Date Value Ref Range Status   11/12/2024 94 70 - 110 mg/dL Final     BUN   Date Value Ref Range Status   11/12/2024 20 8 - 23 mg/dL Final     Creatinine   Date Value Ref Range Status   11/12/2024 0.8 0.5 - 1.4 mg/dL Final  "    Calcium   Date Value Ref Range Status   11/12/2024 9.8 8.7 - 10.5 mg/dL Final     Total Protein   Date Value Ref Range Status   11/12/2024 6.9 6.0 - 8.4 g/dL Final     Albumin   Date Value Ref Range Status   11/12/2024 4.3 3.5 - 5.2 g/dL Final     Total Bilirubin   Date Value Ref Range Status   11/12/2024 0.8 0.1 - 1.0 mg/dL Final     Comment:     For infants and newborns, interpretation of results should be based  on gestational age, weight and in agreement with clinical  observations.    Premature Infant recommended reference ranges:  Up to 24 hours.............<8.0 mg/dL  Up to 48 hours............<12.0 mg/dL  3-5 days..................<15.0 mg/dL  6-29 days.................<15.0 mg/dL       Alkaline Phosphatase   Date Value Ref Range Status   11/12/2024 131 55 - 135 U/L Final     AST   Date Value Ref Range Status   11/12/2024 31 10 - 40 U/L Final     ALT   Date Value Ref Range Status   11/12/2024 71 (H) 10 - 44 U/L Final     Anion Gap   Date Value Ref Range Status   11/12/2024 7 (L) 8 - 16 mmol/L Final     eGFR if    Date Value Ref Range Status   05/19/2022 >60.0 >60 mL/min/1.73 m^2 Final     eGFR if non    Date Value Ref Range Status   05/19/2022 >60.0 >60 mL/min/1.73 m^2 Final     Comment:     Calculation used to obtain the estimated glomerular filtration  rate (eGFR) is the CKD-EPI equation.        No results found for: "CEA"  No results found for: "PSA"        Assessment/Plan:     Problem List Items Addressed This Visit       Microinvasive Left Breast Cancer - Primary     Patient is doing well.  She has no sign of recurrence and appears CHASE at this time.  She did not tolerate the AI but note is made of very small and low-grade disease process.  Will continue to see her on a six month basis.           Relevant Orders    CBC Auto Differential    Comprehensive Metabolic Panel         Discussion:     Follow up in about 6 months (around 5/12/2025).      Electronically signed by " Lasha Monzon

## 2025-06-12 NOTE — TELEPHONE ENCOUNTER
----- Message from Bandar Méndez MA sent at 9/1/2022 10:52 AM CDT -----  Pt is calling to reschedule her 9/21 appt and would like to be seen on 10/3 instead. [phone]     Left a message to return call.

## (undated) DEVICE — TIP BOVIE 4 0014A

## (undated) DEVICE — SYRINGE HYPODERMC LL 22G 1.5 ECLIPSE 30

## (undated) DEVICE — TRAY GENERAL SURGERY

## (undated) DEVICE — SUTURE VICRYL 3-0 18 VCP774D

## (undated) DEVICE — DRAPE 3/4

## (undated) DEVICE — SUTURE VICRYL 4-0 18 VCP773D

## (undated) DEVICE — CLIP CORRECT TISSUE ORIENTATION

## (undated) DEVICE — TIP BOVIE ENT 2.75      E1455

## (undated) DEVICE — SPONGE GAUZE 2S 4X4 STERILE NON21424

## (undated) DEVICE — MARKER TISSUE MARGIN

## (undated) DEVICE — DISSECTOR EXACT LIGASURE 20.6MM-21CM

## (undated) DEVICE — PAD BOVIE ADULT

## (undated) DEVICE — COVER LIGHT HANDLE LB53

## (undated) DEVICE — Device

## (undated) DEVICE — SPONGE XRAY DETECTABLE 4X4

## (undated) DEVICE — ADHESIVE TISSUE EXOFIN

## (undated) DEVICE — SUTURE VICRYL 2-0 CT-1   J739D

## (undated) DEVICE — SOLUTION IRRI NS BOTTLE 1000ML R5200-01

## (undated) DEVICE — LOCALIZER 20 PROBE SET US

## (undated) DEVICE — GLOVE BIOGEL MICRO SURGEON PINK SZ 7

## (undated) DEVICE — PAD TRENDELENBURG POS. KIT

## (undated) DEVICE — DRESSING TEGADERM 4X4 3/4 TD1004

## (undated) DEVICE — BLADE SCALPEL #15 371115

## (undated) DEVICE — STAPLER SKIN NON ROTATE PXW35

## (undated) DEVICE — DRAPE LAPAROTOMY TRANSVERSE 89281